# Patient Record
Sex: FEMALE | Race: WHITE | Employment: PART TIME | URBAN - METROPOLITAN AREA
[De-identification: names, ages, dates, MRNs, and addresses within clinical notes are randomized per-mention and may not be internally consistent; named-entity substitution may affect disease eponyms.]

---

## 2020-09-15 ENCOUNTER — NURSE ONLY (OUTPATIENT)
Dept: PRIMARY CARE CLINIC | Age: 65
End: 2020-09-15

## 2020-09-15 PROCEDURE — 99211 OFF/OP EST MAY X REQ PHY/QHP: CPT | Performed by: NURSE PRACTITIONER

## 2020-09-15 NOTE — PROGRESS NOTES
[facility] / [facility] ([facility]) YORDY Cordova [address] [place], [address]    Acknowledgment of Informed Consent for Surgical or Medical Procedure and Sedation  I agree to allow doctor(s) Abhi Del Rosario and his/her associates or assistants, including residents and/or other qualified medical practitioner to perform the following medical treatment or procedure and to administer or direct the administration of sedation as necessary:  Procedure(s): LEFT OPEN REDUCTION INTERNAL FIXATION DISTAL RADIUS; APPLICATION OF SHORT ARM CAST RIGHT ARM  My doctor has explained the following regarding the proposed procedure:   the explanation of the procedure   the benefits of the procedure   the potential problems that might occur during recuperation   the risks and side effects of the procedure which could include but are not limited to severe blood loss, infection, stroke or death   the benefits, risks and side effect of alternative procedures including the consequences of declining this procedure or any alternative procedures   the likelihood of achieving satisfactory results. I acknowledge no guarantee or assurance has been made to me regarding the results. I understand that during the course of this treatment/procedure, unforeseen conditions can occur which require an additional or different procedure. I agree to allow my physician or assistants to perform such extension of the original procedure as they may find necessary. I understand that sedation will often result in temporary impairment of memory and fine motor skills and that sedation can occasionally progress to a state of deep sedation or general anesthesia. I understand the risks of anesthesia for surgery include, but are not limited to, sore throat, hoarseness, injury to face, mouth, or teeth; nausea; headache; injury to blood vessels or nerves; death, brain damage, or paralysis.     I understand that if I have a Limitation of Treatment order in effect during my hospitalization, the order may or may not be in effect during this procedure. I give my doctor permission to give me blood or blood products. I understand that there are risks with receiving blood such as hepatitis, AIDS, fever, or allergic reaction. I acknowledge that the risks, benefits, and alternatives of this treatment have been explained to me and that no express or implied warranty has been given by the hospital, any blood bank, or any person or entity as to the blood or blood components transfused. At the discretion of my doctor, I agree to allow observers, equipment/product representatives and allow photographing, and/or televising of the procedure, provided my name or identity is maintained confidentially. I agree the hospital may dispose of or use for scientific or educational purposes any tissue, fluid, or body parts which may be removed.     ________________________________Date________Time______ am/pm  ([place] [facility])  Patient or Signature of Closest Relative or Legal Guardian    ________________________________Date________Time______am/pm      Page 1 of  1  Witness

## 2020-09-16 LAB — SARS-COV-2, NAA: NOT DETECTED

## 2020-09-16 NOTE — PROGRESS NOTES

## 2020-09-17 ENCOUNTER — ANESTHESIA EVENT (OUTPATIENT)
Dept: OPERATING ROOM | Age: 65
End: 2020-09-17
Payer: COMMERCIAL

## 2020-09-17 NOTE — RESULT ENCOUNTER NOTE
Your test for COVID-19, also known as novel coronavirus, came back negative/ not detected. No virus was detected from the sample collected. Testing is not 100%. Until your symptoms are fully resolved, you may still be contagious. We recommend that you remain isolated for 7 days minimum or 24-48 hours after your symptoms have completely resolved, whichever is longer. If you were exposed to a known positive COVID-19 patient, then you must remain quaratined for 14 days. If you were tested for an upcoming procedue, then you should remain in quaratine until your procedure. Continually monitor symptoms. Contact a medical provider if symptoms are worsening. If you have any additional questions, contact your PCP.     For additional information, please visit the Centers for Disease Control and Prevention [email or web address]

## 2020-09-18 ENCOUNTER — HOSPITAL ENCOUNTER (OUTPATIENT)
Age: 65
Setting detail: OUTPATIENT SURGERY
Discharge: HOME | End: 2020-09-18
Attending: ORTHOPAEDIC SURGERY | Admitting: ORTHOPAEDIC SURGERY
Payer: COMMERCIAL

## 2020-09-18 ENCOUNTER — ANESTHESIA (OUTPATIENT)
Dept: OPERATING ROOM | Age: 65
End: 2020-09-18
Payer: COMMERCIAL

## 2020-09-18 VITALS
OXYGEN SATURATION: 95 % | TEMPERATURE: 98 F | SYSTOLIC BLOOD PRESSURE: 150 MMHG | RESPIRATION RATE: 18 BRPM | HEIGHT: 60 IN | HEART RATE: 78 BPM | BODY MASS INDEX: 28.66 KG/M2 | WEIGHT: 146 LBS | DIASTOLIC BLOOD PRESSURE: 64 MMHG

## 2020-09-18 VITALS — SYSTOLIC BLOOD PRESSURE: 174 MMHG | TEMPERATURE: 97.3 F | OXYGEN SATURATION: 100 % | DIASTOLIC BLOOD PRESSURE: 90 MMHG

## 2020-09-18 DIAGNOSIS — S52.552D OTHER CLOSED EXTRA-ARTICULAR FRACTURE OF DISTAL END OF LEFT RADIUS WITH ROUTINE HEALING, SUBSEQUENT ENCOUNTER: Primary | ICD-10-CM

## 2020-09-18 DIAGNOSIS — S52.531D CLOSED COLLES' FRACTURE OF RIGHT RADIUS WITH ROUTINE HEALING, SUBSEQUENT ENCOUNTER: ICD-10-CM

## 2020-09-18 PROCEDURE — 6370000000 HC RX 637 (ALT 250 FOR IP): Performed by: NURSE ANESTHETIST, CERTIFIED REGISTERED

## 2020-09-18 PROCEDURE — 3600000004 HC SURGERY LEVEL 4 BASE: Performed by: ORTHOPAEDIC SURGERY

## 2020-09-18 PROCEDURE — 7100000001 HC PACU RECOVERY - ADDTL 15 MIN: Performed by: ORTHOPAEDIC SURGERY

## 2020-09-18 PROCEDURE — 7100000000 HC PACU RECOVERY - FIRST 15 MIN: Performed by: ORTHOPAEDIC SURGERY

## 2020-09-18 PROCEDURE — 2580000003 HC RX 258: Performed by: ANESTHESIOLOGY

## 2020-09-18 PROCEDURE — 7100000011 HC PHASE II RECOVERY - ADDTL 15 MIN: Performed by: ORTHOPAEDIC SURGERY

## 2020-09-18 PROCEDURE — 2500000003 HC RX 250 WO HCPCS: Performed by: NURSE ANESTHETIST, CERTIFIED REGISTERED

## 2020-09-18 PROCEDURE — 3700000001 HC ADD 15 MINUTES (ANESTHESIA): Performed by: ORTHOPAEDIC SURGERY

## 2020-09-18 PROCEDURE — 7100000010 HC PHASE II RECOVERY - FIRST 15 MIN: Performed by: ORTHOPAEDIC SURGERY

## 2020-09-18 PROCEDURE — 6360000002 HC RX W HCPCS: Performed by: NURSE ANESTHETIST, CERTIFIED REGISTERED

## 2020-09-18 PROCEDURE — 2709999900 HC NON-CHARGEABLE SUPPLY: Performed by: ORTHOPAEDIC SURGERY

## 2020-09-18 PROCEDURE — 3700000000 HC ANESTHESIA ATTENDED CARE: Performed by: ORTHOPAEDIC SURGERY

## 2020-09-18 PROCEDURE — C1713 ANCHOR/SCREW BN/BN,TIS/BN: HCPCS | Performed by: ORTHOPAEDIC SURGERY

## 2020-09-18 PROCEDURE — 2580000003 HC RX 258: Performed by: NURSE ANESTHETIST, CERTIFIED REGISTERED

## 2020-09-18 PROCEDURE — 2580000003 HC RX 258: Performed by: ORTHOPAEDIC SURGERY

## 2020-09-18 PROCEDURE — 6360000002 HC RX W HCPCS: Performed by: ORTHOPAEDIC SURGERY

## 2020-09-18 PROCEDURE — 2720000010 HC SURG SUPPLY STERILE: Performed by: ORTHOPAEDIC SURGERY

## 2020-09-18 PROCEDURE — 2500000003 HC RX 250 WO HCPCS: Performed by: ORTHOPAEDIC SURGERY

## 2020-09-18 PROCEDURE — 3600000014 HC SURGERY LEVEL 4 ADDTL 15MIN: Performed by: ORTHOPAEDIC SURGERY

## 2020-09-18 PROCEDURE — 6360000002 HC RX W HCPCS: Performed by: ANESTHESIOLOGY

## 2020-09-18 DEVICE — SCREW BNE L16MM DIA2.4MM DST RAD VOLAR S STL ST VAR ANG LOK: Type: IMPLANTABLE DEVICE | Site: WRIST | Status: FUNCTIONAL

## 2020-09-18 DEVICE — PLATE BNE W22XL54MM STD 9 H R DST RAD VOLAR S STL VAR ANG: Type: IMPLANTABLE DEVICE | Site: WRIST | Status: FUNCTIONAL

## 2020-09-18 DEVICE — SCREW BNE L18MM DIA2.4MM DST RAD VOLAR S STL ST VAR ANG LOK: Type: IMPLANTABLE DEVICE | Site: WRIST | Status: FUNCTIONAL

## 2020-09-18 DEVICE — SCREW BNE L12MM DIA2.7MM CORT S STL ST T8 STARDRV RECESS: Type: IMPLANTABLE DEVICE | Site: WRIST | Status: FUNCTIONAL

## 2020-09-18 DEVICE — SCREW BNE L14MM DIA2.4MM DST RAD VOLAR S STL ST VAR ANG LOK: Type: IMPLANTABLE DEVICE | Site: WRIST | Status: FUNCTIONAL

## 2020-09-18 RX ORDER — ROPIVACAINE HYDROCHLORIDE 5 MG/ML
30 INJECTION, SOLUTION EPIDURAL; INFILTRATION; PERINEURAL ONCE
Status: DISCONTINUED | OUTPATIENT
Start: 2020-09-18 | End: 2020-09-18 | Stop reason: HOSPADM

## 2020-09-18 RX ORDER — HYDROMORPHONE HCL 110MG/55ML
PATIENT CONTROLLED ANALGESIA SYRINGE INTRAVENOUS PRN
Status: DISCONTINUED | OUTPATIENT
Start: 2020-09-18 | End: 2020-09-18 | Stop reason: SDUPTHER

## 2020-09-18 RX ORDER — ATORVASTATIN CALCIUM 10 MG/1
10 TABLET, FILM COATED ORAL DAILY
COMMUNITY

## 2020-09-18 RX ORDER — LABETALOL 20 MG/4 ML (5 MG/ML) INTRAVENOUS SYRINGE
5 EVERY 10 MIN PRN
Status: DISCONTINUED | OUTPATIENT
Start: 2020-09-18 | End: 2020-09-18 | Stop reason: HOSPADM

## 2020-09-18 RX ORDER — ACETAMINOPHEN AND CODEINE PHOSPHATE 300; 30 MG/1; MG/1
1 TABLET ORAL EVERY 4 HOURS PRN
Status: ON HOLD | COMMUNITY
End: 2020-09-18 | Stop reason: SDUPTHER

## 2020-09-18 RX ORDER — LETROZOLE 2.5 MG/1
2.5 TABLET, FILM COATED ORAL DAILY
COMMUNITY
End: 2021-08-31

## 2020-09-18 RX ORDER — MEPERIDINE HYDROCHLORIDE 25 MG/ML
12.5 INJECTION INTRAMUSCULAR; INTRAVENOUS; SUBCUTANEOUS EVERY 5 MIN PRN
Status: DISCONTINUED | OUTPATIENT
Start: 2020-09-18 | End: 2020-09-18 | Stop reason: HOSPADM

## 2020-09-18 RX ORDER — OXYCODONE HYDROCHLORIDE AND ACETAMINOPHEN 5; 325 MG/1; MG/1
2 TABLET ORAL PRN
Status: DISCONTINUED | OUTPATIENT
Start: 2020-09-18 | End: 2020-09-18 | Stop reason: HOSPADM

## 2020-09-18 RX ORDER — SODIUM CHLORIDE, SODIUM LACTATE, POTASSIUM CHLORIDE, CALCIUM CHLORIDE 600; 310; 30; 20 MG/100ML; MG/100ML; MG/100ML; MG/100ML
INJECTION, SOLUTION INTRAVENOUS CONTINUOUS PRN
Status: DISCONTINUED | OUTPATIENT
Start: 2020-09-18 | End: 2020-09-18 | Stop reason: SDUPTHER

## 2020-09-18 RX ORDER — OXYCODONE HYDROCHLORIDE AND ACETAMINOPHEN 5; 325 MG/1; MG/1
1 TABLET ORAL PRN
Status: DISCONTINUED | OUTPATIENT
Start: 2020-09-18 | End: 2020-09-18 | Stop reason: HOSPADM

## 2020-09-18 RX ORDER — DIPHENHYDRAMINE HYDROCHLORIDE 50 MG/ML
INJECTION INTRAMUSCULAR; INTRAVENOUS PRN
Status: DISCONTINUED | OUTPATIENT
Start: 2020-09-18 | End: 2020-09-18 | Stop reason: SDUPTHER

## 2020-09-18 RX ORDER — DIPHENHYDRAMINE HYDROCHLORIDE 50 MG/ML
12.5 INJECTION INTRAMUSCULAR; INTRAVENOUS
Status: DISCONTINUED | OUTPATIENT
Start: 2020-09-18 | End: 2020-09-18 | Stop reason: HOSPADM

## 2020-09-18 RX ORDER — BUPIVACAINE HYDROCHLORIDE 5 MG/ML
INJECTION, SOLUTION EPIDURAL; INTRACAUDAL PRN
Status: DISCONTINUED | OUTPATIENT
Start: 2020-09-18 | End: 2020-09-18 | Stop reason: ALTCHOICE

## 2020-09-18 RX ORDER — ONDANSETRON 4 MG/1
4 TABLET, ORALLY DISINTEGRATING ORAL EVERY 8 HOURS PRN
Qty: 21 TABLET | Refills: 0 | Status: SHIPPED | OUTPATIENT
Start: 2020-09-18

## 2020-09-18 RX ORDER — SODIUM CHLORIDE 9 MG/ML
INJECTION, SOLUTION INTRAVENOUS CONTINUOUS
Status: DISCONTINUED | OUTPATIENT
Start: 2020-09-18 | End: 2020-09-18 | Stop reason: HOSPADM

## 2020-09-18 RX ORDER — ASPIRIN 81 MG/1
81 TABLET ORAL DAILY
COMMUNITY

## 2020-09-18 RX ORDER — FENTANYL CITRATE 50 UG/ML
INJECTION, SOLUTION INTRAMUSCULAR; INTRAVENOUS PRN
Status: DISCONTINUED | OUTPATIENT
Start: 2020-09-18 | End: 2020-09-18 | Stop reason: SDUPTHER

## 2020-09-18 RX ORDER — SCOLOPAMINE TRANSDERMAL SYSTEM 1 MG/1
PATCH, EXTENDED RELEASE TRANSDERMAL PRN
Status: DISCONTINUED | OUTPATIENT
Start: 2020-09-18 | End: 2020-09-18 | Stop reason: SDUPTHER

## 2020-09-18 RX ORDER — HYDRALAZINE HYDROCHLORIDE 20 MG/ML
5 INJECTION INTRAMUSCULAR; INTRAVENOUS EVERY 10 MIN PRN
Status: DISCONTINUED | OUTPATIENT
Start: 2020-09-18 | End: 2020-09-18 | Stop reason: HOSPADM

## 2020-09-18 RX ORDER — PROCHLORPERAZINE EDISYLATE 5 MG/ML
5 INJECTION INTRAMUSCULAR; INTRAVENOUS
Status: COMPLETED | OUTPATIENT
Start: 2020-09-18 | End: 2020-09-18

## 2020-09-18 RX ORDER — ACETAMINOPHEN AND CODEINE PHOSPHATE 300; 30 MG/1; MG/1
1 TABLET ORAL EVERY 6 HOURS PRN
Qty: 28 TABLET | Refills: 0 | Status: SHIPPED | OUTPATIENT
Start: 2020-09-18 | End: 2020-09-25

## 2020-09-18 RX ORDER — MIDAZOLAM HYDROCHLORIDE 1 MG/ML
INJECTION INTRAMUSCULAR; INTRAVENOUS PRN
Status: DISCONTINUED | OUTPATIENT
Start: 2020-09-18 | End: 2020-09-18 | Stop reason: SDUPTHER

## 2020-09-18 RX ORDER — SODIUM CHLORIDE 0.9 % (FLUSH) 0.9 %
10 SYRINGE (ML) INJECTION PRN
Status: DISCONTINUED | OUTPATIENT
Start: 2020-09-18 | End: 2020-09-18 | Stop reason: HOSPADM

## 2020-09-18 RX ORDER — LIDOCAINE HYDROCHLORIDE 20 MG/ML
INJECTION, SOLUTION INTRAVENOUS PRN
Status: DISCONTINUED | OUTPATIENT
Start: 2020-09-18 | End: 2020-09-18 | Stop reason: SDUPTHER

## 2020-09-18 RX ORDER — ONDANSETRON 2 MG/ML
INJECTION INTRAMUSCULAR; INTRAVENOUS PRN
Status: DISCONTINUED | OUTPATIENT
Start: 2020-09-18 | End: 2020-09-18 | Stop reason: SDUPTHER

## 2020-09-18 RX ORDER — SODIUM CHLORIDE, SODIUM LACTATE, POTASSIUM CHLORIDE, CALCIUM CHLORIDE 600; 310; 30; 20 MG/100ML; MG/100ML; MG/100ML; MG/100ML
INJECTION, SOLUTION INTRAVENOUS CONTINUOUS
Status: DISCONTINUED | OUTPATIENT
Start: 2020-09-18 | End: 2020-09-18 | Stop reason: HOSPADM

## 2020-09-18 RX ORDER — FENTANYL CITRATE 50 UG/ML
50 INJECTION, SOLUTION INTRAMUSCULAR; INTRAVENOUS EVERY 5 MIN PRN
Status: DISCONTINUED | OUTPATIENT
Start: 2020-09-18 | End: 2020-09-18 | Stop reason: HOSPADM

## 2020-09-18 RX ORDER — MAGNESIUM HYDROXIDE 1200 MG/15ML
LIQUID ORAL CONTINUOUS PRN
Status: COMPLETED | OUTPATIENT
Start: 2020-09-18 | End: 2020-09-18

## 2020-09-18 RX ORDER — FENTANYL CITRATE 50 UG/ML
25 INJECTION, SOLUTION INTRAMUSCULAR; INTRAVENOUS EVERY 5 MIN PRN
Status: DISCONTINUED | OUTPATIENT
Start: 2020-09-18 | End: 2020-09-18 | Stop reason: HOSPADM

## 2020-09-18 RX ORDER — SODIUM CHLORIDE 0.9 % (FLUSH) 0.9 %
10 SYRINGE (ML) INJECTION EVERY 12 HOURS SCHEDULED
Status: DISCONTINUED | OUTPATIENT
Start: 2020-09-18 | End: 2020-09-18 | Stop reason: HOSPADM

## 2020-09-18 RX ORDER — AMLODIPINE BESYLATE 5 MG/1
5 TABLET ORAL DAILY
COMMUNITY

## 2020-09-18 RX ORDER — ONDANSETRON 2 MG/ML
4 INJECTION INTRAMUSCULAR; INTRAVENOUS
Status: COMPLETED | OUTPATIENT
Start: 2020-09-18 | End: 2020-09-18

## 2020-09-18 RX ORDER — PROPOFOL 10 MG/ML
INJECTION, EMULSION INTRAVENOUS PRN
Status: DISCONTINUED | OUTPATIENT
Start: 2020-09-18 | End: 2020-09-18 | Stop reason: SDUPTHER

## 2020-09-18 RX ADMIN — HYDROMORPHONE HYDROCHLORIDE 0.5 MG: 2 INJECTION, SOLUTION INTRAMUSCULAR; INTRAVENOUS; SUBCUTANEOUS at 08:03

## 2020-09-18 RX ADMIN — HYDROMORPHONE HYDROCHLORIDE 0.5 MG: 1 INJECTION, SOLUTION INTRAMUSCULAR; INTRAVENOUS; SUBCUTANEOUS at 10:37

## 2020-09-18 RX ADMIN — CEFAZOLIN 2 G: 10 INJECTION, POWDER, FOR SOLUTION INTRAVENOUS at 07:37

## 2020-09-18 RX ADMIN — DIPHENHYDRAMINE HYDROCHLORIDE 25 MG: 50 INJECTION, SOLUTION INTRAMUSCULAR; INTRAVENOUS at 07:46

## 2020-09-18 RX ADMIN — HYDROMORPHONE HYDROCHLORIDE 0.25 MG: 1 INJECTION, SOLUTION INTRAMUSCULAR; INTRAVENOUS; SUBCUTANEOUS at 11:20

## 2020-09-18 RX ADMIN — HYDROMORPHONE HYDROCHLORIDE 0.5 MG: 2 INJECTION, SOLUTION INTRAMUSCULAR; INTRAVENOUS; SUBCUTANEOUS at 08:10

## 2020-09-18 RX ADMIN — SODIUM CHLORIDE, SODIUM LACTATE, POTASSIUM CHLORIDE, AND CALCIUM CHLORIDE: 600; 310; 30; 20 INJECTION, SOLUTION INTRAVENOUS at 07:20

## 2020-09-18 RX ADMIN — FENTANYL CITRATE 50 MCG: 50 INJECTION INTRAMUSCULAR; INTRAVENOUS at 10:06

## 2020-09-18 RX ADMIN — SCOPOLAMINE 1 PATCH: 1 PATCH, EXTENDED RELEASE TRANSDERMAL at 07:34

## 2020-09-18 RX ADMIN — HYDROMORPHONE HYDROCHLORIDE 0.25 MG: 1 INJECTION, SOLUTION INTRAMUSCULAR; INTRAVENOUS; SUBCUTANEOUS at 10:52

## 2020-09-18 RX ADMIN — ONDANSETRON 4 MG: 2 INJECTION INTRAMUSCULAR; INTRAVENOUS at 09:32

## 2020-09-18 RX ADMIN — FENTANYL CITRATE 50 MCG: 50 INJECTION INTRAMUSCULAR; INTRAVENOUS at 07:58

## 2020-09-18 RX ADMIN — PROPOFOL 80 MG: 10 INJECTION, EMULSION INTRAVENOUS at 07:42

## 2020-09-18 RX ADMIN — ONDANSETRON 4 MG: 2 INJECTION INTRAMUSCULAR; INTRAVENOUS at 08:55

## 2020-09-18 RX ADMIN — FENTANYL CITRATE 50 MCG: 50 INJECTION INTRAMUSCULAR; INTRAVENOUS at 07:55

## 2020-09-18 RX ADMIN — PROCHLORPERAZINE EDISYLATE 5 MG: 5 INJECTION INTRAMUSCULAR; INTRAVENOUS at 10:19

## 2020-09-18 RX ADMIN — FENTANYL CITRATE 50 MCG: 50 INJECTION INTRAMUSCULAR; INTRAVENOUS at 09:45

## 2020-09-18 RX ADMIN — MIDAZOLAM HYDROCHLORIDE 2 MG: 2 INJECTION, SOLUTION INTRAMUSCULAR; INTRAVENOUS at 07:34

## 2020-09-18 RX ADMIN — SODIUM CHLORIDE, SODIUM LACTATE, POTASSIUM CHLORIDE, AND CALCIUM CHLORIDE: 600; 310; 30; 20 INJECTION, SOLUTION INTRAVENOUS at 08:44

## 2020-09-18 RX ADMIN — LIDOCAINE HYDROCHLORIDE 100 MG: 20 INJECTION, SOLUTION INTRAVENOUS at 07:42

## 2020-09-18 RX ADMIN — FAMOTIDINE 20 MG: 10 INJECTION, SOLUTION INTRAVENOUS at 07:46

## 2020-09-18 RX ADMIN — SODIUM CHLORIDE, POTASSIUM CHLORIDE, SODIUM LACTATE AND CALCIUM CHLORIDE: 600; 310; 30; 20 INJECTION, SOLUTION INTRAVENOUS at 07:21

## 2020-09-18 ASSESSMENT — PULMONARY FUNCTION TESTS
PIF_VALUE: 17
PIF_VALUE: 17
PIF_VALUE: 4
PIF_VALUE: 17
PIF_VALUE: 13
PIF_VALUE: 17
PIF_VALUE: 3
PIF_VALUE: 17
PIF_VALUE: 14
PIF_VALUE: 17
PIF_VALUE: 17
PIF_VALUE: 18
PIF_VALUE: 17
PIF_VALUE: 17
PIF_VALUE: 18
PIF_VALUE: 0
PIF_VALUE: 17
PIF_VALUE: 17
PIF_VALUE: 14
PIF_VALUE: 17
PIF_VALUE: 24
PIF_VALUE: 17
PIF_VALUE: 14
PIF_VALUE: 17
PIF_VALUE: 15
PIF_VALUE: 17
PIF_VALUE: 18
PIF_VALUE: 17
PIF_VALUE: 13
PIF_VALUE: 3
PIF_VALUE: 13
PIF_VALUE: 17
PIF_VALUE: 17
PIF_VALUE: 9
PIF_VALUE: 11
PIF_VALUE: 17
PIF_VALUE: 18
PIF_VALUE: 17
PIF_VALUE: 17
PIF_VALUE: 0
PIF_VALUE: 17
PIF_VALUE: 18
PIF_VALUE: 17
PIF_VALUE: 0
PIF_VALUE: 15
PIF_VALUE: 17
PIF_VALUE: 15
PIF_VALUE: 17
PIF_VALUE: 17
PIF_VALUE: 18
PIF_VALUE: 14
PIF_VALUE: 14
PIF_VALUE: 15
PIF_VALUE: 17
PIF_VALUE: 17
PIF_VALUE: 13
PIF_VALUE: 13
PIF_VALUE: 17
PIF_VALUE: 17

## 2020-09-18 ASSESSMENT — PAIN SCALES - GENERAL
PAINLEVEL_OUTOF10: 9
PAINLEVEL_OUTOF10: 4
PAINLEVEL_OUTOF10: 9
PAINLEVEL_OUTOF10: 9
PAINLEVEL_OUTOF10: 5
PAINLEVEL_OUTOF10: 4

## 2020-09-18 ASSESSMENT — PAIN DESCRIPTION - FREQUENCY
FREQUENCY: CONTINUOUS
FREQUENCY: CONTINUOUS

## 2020-09-18 ASSESSMENT — PAIN DESCRIPTION - ONSET
ONSET: ON-GOING

## 2020-09-18 ASSESSMENT — PAIN DESCRIPTION - DESCRIPTORS
DESCRIPTORS: DISCOMFORT

## 2020-09-18 ASSESSMENT — PAIN DESCRIPTION - ORIENTATION
ORIENTATION: LEFT
ORIENTATION: LEFT

## 2020-09-18 ASSESSMENT — PAIN - FUNCTIONAL ASSESSMENT: PAIN_FUNCTIONAL_ASSESSMENT: 0-10

## 2020-09-18 ASSESSMENT — PAIN DESCRIPTION - PAIN TYPE
TYPE: SURGICAL PAIN
TYPE: SURGICAL PAIN

## 2020-09-18 ASSESSMENT — PAIN DESCRIPTION - LOCATION
LOCATION: WRIST

## 2020-09-18 ASSESSMENT — PAIN DESCRIPTION - PROGRESSION
CLINICAL_PROGRESSION: GRADUALLY IMPROVING
CLINICAL_PROGRESSION: NOT CHANGED
CLINICAL_PROGRESSION: GRADUALLY IMPROVING

## 2020-09-18 NOTE — PROGRESS NOTES
All discharge instructions given to patient's  in lobby with full understanding verbalized and all questions answered at [phone]. Copy provided along with prescriptions x 2.

## 2020-09-18 NOTE — PROGRESS NOTES
Patient admitted to PACU #17 from OR per stretcher at 0603 s/p LEFT OPEN REDUCTION INTERNAL FIXATION DISTAL RADIUS; APPLICATION OF SHORT ARM CAST RIGHT ARM (Left). Report received at bedside in PACU per CRNA. Patient was reported to be hemodynamically stable in OR with no complications reported. Patient connected to PACU monitoring equipment. IVF's left foot infusing with site unremarkable. Patient arrived to PACU responsive but not fully wakeful from anesthesia but with respirations easy, even and regular with no pain noted or verbalized. Right hand/lower arm with cast in place. Left hand/lower arm with splint in place. LUE elevated on pillow upon arrival to PACU with ice pack applied. No further changes. Will continue to monitor.

## 2020-09-18 NOTE — PROGRESS NOTES
Per Dr Megan Walker the IV will need to be in foot. Patient agrees and wants to avoid right foot. IV per Dr Megan Walker.

## 2020-09-18 NOTE — PROGRESS NOTES
Patient's  updated per Jean Carlos Moe in family waiting at [phone] and informed that we are waiting for a bed in same day for discharge.

## 2020-09-18 NOTE — PROGRESS NOTES
Ambulatory Surgery/Procedure Discharge Note    Vitals:    09/18/20 1215   BP: (!) 150/64   Pulse: 78   Resp: 18   Temp: 98 °F (36.7 °C)   SpO2: 95%       In: 1337 [P.O.:60; I.V.:1277]  Out: -     Restroom use offered before discharge. Yes but patient denied    Pain assessment:  present - adequately treated  Pain Level: 4        Patient discharged to home/self care. Patient discharged via wheel chair by transporter to waiting family/S. O. patient meets brenden discharge criteria from PACU. Discharged to home per wheelchair with . No further complaints or concerns verbalized.        9/18/2020 12:35 PM

## 2020-09-18 NOTE — ANESTHESIA PRE PROCEDURE
Packs/day: 1.00     Years: 30.00     Pack years: 30.00    Smokeless tobacco: Never Used   Substance Use Topics    Alcohol use: No                                Ready to quit: Not Answered  Counseling given: Not Answered      Vital Signs (Current):   Vitals:    09/18/20 0625   BP: 131/77   Pulse: 70   Resp: 16   Temp: 98.1 °F (36.7 °C)   TempSrc: Oral   SpO2: 94%   Weight: 146 lb (66.2 kg)   Height: 5' (1.524 m)                                              BP Readings from Last 3 Encounters:   09/18/20 131/77   03/04/11 116/60       NPO Status:                                                                                 BMI:   Wt Readings from Last 3 Encounters:   09/18/20 146 lb (66.2 kg)   03/04/11 145 lb (65.8 kg)     Body mass index is 28.51 kg/m². CBC: No results found for: WBC, RBC, HGB, HCT, MCV, RDW, PLT    CMP:   Lab Results   Component Value Date    CO2 24 10/20/2014    CREATININE 1.1 10/20/2014    GFRAA >60 10/20/2014    LABGLOM 54 10/20/2014       POC Tests: No results for input(s): POCGLU, POCNA, POCK, POCCL, POCBUN, POCHEMO, POCHCT in the last 72 hours.     Coags: No results found for: PROTIME, INR, APTT    HCG (If Applicable): No results found for: PREGTESTUR, PREGSERUM, HCG, HCGQUANT     ABGs: No results found for: PHART, PO2ART, NKD9IGW, TGY4BOM, BEART, Z8ISRRGW     Type & Screen (If Applicable):  No results found for: LABABO, LABRH    Drug/Infectious Status (If Applicable):  No results found for: HIV, HEPCAB    COVID-19 Screening (If Applicable):   Lab Results   Component Value Date    COVID19 NOT DETECTED 09/15/2020         Anesthesia Evaluation   no history of anesthetic complications:   Airway: Mallampati: II  TM distance: >3 FB   Neck ROM: full  Mouth opening: > = 3 FB Dental:          Pulmonary:       (-) asthma and sleep apnea                           Cardiovascular:  Exercise tolerance: poor (<4 METS),   (+) past MI:, CAD:,                   Neuro/Psych:      (-) seizures GI/Hepatic/Renal:        (-) GERD       Endo/Other:    (+) malignancy/cancer (breast). (-) diabetes mellitus               Abdominal:           Vascular:                                      Anesthesia Plan      general     ASA 3     (-npo MN  -denies chest pain or palp. Was having severe chest pain in 2014 when she had her MI/stent. Denies sob.)      MIPS: Postoperative opioids intended. Anesthetic plan and risks discussed with patient. Plan discussed with CRNA.                 Miles Platt MD   9/18/2020

## 2020-09-18 NOTE — BRIEF OP NOTE
Brief Postoperative Note      Patient: Christiano Marroquin  YOB: 1955  MRN: [id number]    Date of Procedure: 9/18/2020    Pre-Op Diagnosis: LEFT DISTAL RADIUS FRACTURE, right distal radius fracture    Post-Op Diagnosis: Same       Procedure(s):  LEFT OPEN REDUCTION INTERNAL FIXATION DISTAL RADIUS; APPLICATION OF SHORT ARM CAST RIGHT ARM, interpretation of fluoroscopic images.     Surgeon(s):  Kristy Almendarez MD    Assistant:  Surgical Assistant: Tashia Burnham    Anesthesia: General    Estimated Blood Loss (mL): less than 50     Complications: None    Specimens:   * No specimens in log *    Implants:  * No implants in log *      Drains: * No LDAs found *    Findings: Displaced extra-articular left distal radius fracture, right nondisplaced distal radius fracture with maintained alignment    Electronically signed by Kristy Almendarez MD on 9/18/2020 at 9:13 AM

## 2020-09-18 NOTE — OP NOTE
Eliza Orellana MD  [address] [place] [address] Office: [address] [facility], [id number], [address] [facility]  [phone] [place] [phone] [facility]  9/15/2020    Pre-operative Diagnosis:Left distal radius fracture, Right Distal radius fracture     Post-operative Diagnosis: same    Procedure:   1. Open reduction internal fixation of Left Distal radius fracture with 0 intra-articular fragments,   2. Intraoperative fluoroscopy interpretation, with multiple views of Left wrist  3. Application of short arm cast to RIGHT arm    Surgeon: Zoie Danielle MD    Assistant:  Aimee Holm, for the purposes of retraction, assistance for reduction, and manipulation of the limb, and assistance with wound closure as needed. Anesthesia:  General, local    Atibiotic: CEFAZOLIN, DOSING PER NURSING CHART    Complications:  None    ESTIMATED BLOOD LOSS:  20mL    Implant: * No implants in log *    Synthes 2.4 VA 2 column distal radius plate    INDICATIONS FOR PROCEDURE: The patient has an unstable Left distal radius fracture and right distal radius fracture for cast change. The patient understands the relevant risks, benefits, limitations, alternatives, and healing process of the procedure. PROCEDURE: The patient was given IV antibiotics, a regional block, and brought to the operating room and anesthetized with general anesthesia. The identified and marked extremity was then prepped and draped in a standard fashion. A well-padded tourniquet was applied to the upper arm. The arm was exsanguinated and the tourniquet elevated to 250 mmHg. Preoperative timeout was performed confirming the correct patient, procedure, laterality and other pertinent details and all present were in agreement. A volar radial incision was the made over the wrist.  Dissection carried through skin, subcutaneous tissue, and the FCR sheath.   The pronator quadratus was elevated off the distal radius and the fracture fragments identified. Via traction, distraction, and manual reduction, the volar and articular alignment was reduced. A volar plate was then applied and the proximal and distal screws inserted. Intraoperative xray demonstrated well maintained fracture and hardware position. The wrist was taken through a range of motion. Stability of the wrist was tested and the DRUJ, radial-carpal, and intra-carpal joints were found to be satisfactory. It should be noted that 3 views of the operative Left wrist were performed with mini C-Arm fluoroscopy for the distal radius fracture. AP, lateral, and oblique views demonstrated satisfactory alignment of the fracture and hardware. The wounds were irrigated with sterile saline and hemostasis achieved with electrocautery. Closure was performed with subcutaneous 2-0 vicryl, and 4-0 Nylon at skin. A post-op dressing and volar splint was applied and the patient transported to the recovery area in stable condition with good perfusion to all fingertips. The right distal radius splint was then removed, maintained alignment confirmed on AP and lateral fluoroscopic views, and a well padded fiberglass cast was applied with a gentle mold to promote stability    Post-Op Course: Follow-up in 7-10 days, wound inspection and likely suture removal.  Transition to wrist brace at that time. No strengthening or lifting for 6-8 weeks.         Nicho Ramirez MD  [facility]  Office: [phone]  Cell: [phone]    09/18/20  9:14 AM

## 2020-09-18 NOTE — H&P
Edmundo Reeves    [id number]    [facility] Same Day Surgery Update H & P  Department of General Surgery   Surgical Service   Pre-operative History and Physical  Last H & P within the last 30 days. DIAGNOSIS:   LEFT DISTAL RADIUS FRACTURE    Procedure(s):  LEFT OPEN REDUCTION INTERNAL FIXATION DISTAL RADIUS     HISTORY OF PRESENT ILLNESS:   Patient with displaced left distal radius fracture presents today for ORIF. Covid 19:  Patient denies fever, chills, cough or known exposure to Covid-19.   Patient reports they have not been quarantined at home since Covid-19 test.      Past Medical History:        Diagnosis Date    CAD (coronary artery disease)     Dental bridge present     Dental crowns present     Osteoporosis      Past Surgical History:        Procedure Laterality Date    AXILLARY SURGERY Right     BREAST SURGERY Right     CORONARY ANGIOPLASTY WITH STENT PLACEMENT      DILATION AND CURETTAGE OF UTERUS      TONSILLECTOMY AND ADENOIDECTOMY       Past Social History:  Social History     Socioeconomic History    Marital status:      Spouse name: None    Number of children: None    Years of education: None    Highest education level: None   Occupational History    None   Social Needs    Financial resource strain: None    Food insecurity     Worry: None     Inability: None    Transportation needs     Medical: None     Non-medical: None   Tobacco Use    Smoking status: Current Every Day Smoker     Packs/day: 1.00     Years: 30.00     Pack years: 30.00    Smokeless tobacco: Never Used   Substance and Sexual Activity    Alcohol use: No    Drug use: No    Sexual activity: None   Lifestyle    Physical activity     Days per week: None     Minutes per session: None    Stress: None   Relationships    Social connections     Talks on phone: None     Gets together: None     Attends Mandaeism service: None     Active member of club or organization: None     Attends meetings of clubs or organizations: None     Relationship status: None    Intimate partner violence     Fear of current or ex partner: None     Emotionally abused: None     Physically abused: None     Forced sexual activity: None   Other Topics Concern    None   Social History Narrative    None         Medications Prior to Admission:      Prior to Admission medications    Medication Sig Start Date End Date Taking? Authorizing Provider   letrozole (FEMARA) 2.5 MG tablet Take 2.5 mg by mouth daily   Yes Historical Provider, MD   atorvastatin (LIPITOR) 10 MG tablet Take 10 mg by mouth daily   Yes Historical Provider, MD   amLODIPine (NORVASC) 5 MG tablet Take 5 mg by mouth daily   Yes Historical Provider, MD   aspirin 81 MG EC tablet Take 81 mg by mouth daily   Yes Historical Provider, MD   Denosumab (PROLIA SC) Inject 1 Syringe into the skin every 6 months   Yes Historical Provider, MD   acetaminophen-codeine (TYLENOL/CODEINE #3) 300-30 MG per tablet Take 1 tablet by mouth every 6 hours as needed for Pain for up to 7 days. 9/18/20 9/25/20 Yes Radha Varela MD   ondansetron (ZOFRAN-ODT) 4 MG disintegrating tablet Take 1 tablet by mouth every 8 hours as needed for Nausea or Vomiting 9/18/20  Yes Radha Varela MD         Allergies:  Codeine and Biaxin [clarithromycin]    PHYSICAL EXAM:      /77   Pulse 70   Temp 98.1 °F (36.7 °C) (Oral)   Resp 16   Ht 5' (1.524 m)   Wt 146 lb (66.2 kg)   SpO2 94%   BMI 28.51 kg/m²      Airway:  Airway patent with no audible stridor    Heart:  regular rate and rhythm, No murmur noted    Lungs:  No increased work of breathing, good air exchange, clear to auscultation bilaterally, no crackles or wheezing    Abdomen:  soft, non-distended, non-tender, no rebound tenderness or guarding, normal active bowel sounds and no masses palpated    ASSESSMENT AND PLAN     Patient is a 71 y.o. female with above specified procedure planned.     1.  Patient seen and focused exam done today-

## 2020-09-18 NOTE — ANESTHESIA POSTPROCEDURE EVALUATION
Department of Anesthesiology  Postprocedure Note    Patient: Vira Pyle  MRN: [id number]  YOB: 1955  Date of evaluation: 9/18/2020  Time:  12:34 PM     Procedure Summary     Date:  09/18/20 Room / Location:  [address] [place] [address] [address]    Anesthesia Start:  5336 Anesthesia Stop:  4057    Procedure:  LEFT OPEN REDUCTION INTERNAL FIXATION DISTAL RADIUS; APPLICATION OF SHORT ARM CAST RIGHT ARM (Left ) Diagnosis:  (LEFT DISTAL RADIUS FRACTURE)    Surgeon:  Kristin Goldman MD Responsible Provider:  Juliann Palacio MD    Anesthesia Type:  general ASA Status:  3          Anesthesia Type: general    Huyen Phase I: Huyen Score: 10    Huyen Phase II: Huyen Score: 10    Last vitals: Reviewed and per EMR flowsheets.        Anesthesia Post Evaluation    Patient location during evaluation: PACU  Patient participation: complete - patient participated  Level of consciousness: awake  Pain score: 2  Airway patency: patent  Nausea & Vomiting: no nausea and no vomiting  Complications: no  Cardiovascular status: hemodynamically stable  Respiratory status: acceptable  Hydration status: euvolemic

## 2020-10-06 ENCOUNTER — OFFICE VISIT (OUTPATIENT)
Dept: PRIMARY CARE CLINIC | Age: 65
End: 2020-10-06
Payer: COMMERCIAL

## 2020-10-06 PROCEDURE — 99211 OFF/OP EST MAY X REQ PHY/QHP: CPT | Performed by: NURSE PRACTITIONER

## 2020-10-06 NOTE — PROGRESS NOTES
[facility] / [facility] ([facility]) [address] [facility], [address]    Acknowledgment of Informed Consent for Surgical or Medical Procedure and Sedation  I agree to allow doctor(s) Sue French and his/her associates or assistants, including residents and/or other qualified medical practitioner to perform the following medical treatment or procedure and to administer or direct the administration of sedation as necessary:  Procedure(s): RIGHT WRIST CLOSED REDUCTION AND CASTING VERSUS DISTAL RADIUS OPEN REDUCTION INTERNAL FIXATION  My doctor has explained the following regarding the proposed procedure:   the explanation of the procedure   the benefits of the procedure   the potential problems that might occur during recuperation   the risks and side effects of the procedure which could include but are not limited to severe blood loss, infection, stroke or death   the benefits, risks and side effect of alternative procedures including the consequences of declining this procedure or any alternative procedures   the likelihood of achieving satisfactory results. I acknowledge no guarantee or assurance has been made to me regarding the results. I understand that during the course of this treatment/procedure, unforeseen conditions can occur which require an additional or different procedure. I agree to allow my physician or assistants to perform such extension of the original procedure as they may find necessary. I understand that sedation will often result in temporary impairment of memory and fine motor skills and that sedation can occasionally progress to a state of deep sedation or general anesthesia. I understand the risks of anesthesia for surgery include, but are not limited to, sore throat, hoarseness, injury to face, mouth, or teeth; nausea; headache; injury to blood vessels or nerves; death, brain damage, or paralysis.     I understand that if I have a Limitation of Treatment order in effect during my hospitalization, the order may or may not be in effect during this procedure. I give my doctor permission to give me blood or blood products. I understand that there are risks with receiving blood such as hepatitis, AIDS, fever, or allergic reaction. I acknowledge that the risks, benefits, and alternatives of this treatment have been explained to me and that no express or implied warranty has been given by the hospital, any blood bank, or any person or entity as to the blood or blood components transfused. At the discretion of my doctor, I agree to allow observers, equipment/product representatives and allow photographing, and/or televising of the procedure, provided my name or identity is maintained confidentially. I agree the hospital may dispose of or use for scientific or educational purposes any tissue, fluid, or body parts which may be removed.     ________________________________Date________Time______ am/pm  ([employer])  Patient or Signature of Closest Relative or Legal Guardian    ________________________________Date________Time______am/pm      Page 1 of  1  Witness

## 2020-10-07 ENCOUNTER — ANESTHESIA EVENT (OUTPATIENT)
Dept: OPERATING ROOM | Age: 65
End: 2020-10-07
Payer: COMMERCIAL

## 2020-10-07 NOTE — PROGRESS NOTES
[facility] PRE-SURGICAL TESTING INSTRUCTIONS                              PRIOR TO PROCEDURE DATE:  1. Please follow any guidelines/instructions prior to your procedure as advised by your surgeon. 2. Arrange for someone to drive you home and be with you for the first 24 hours after discharge for your safety after your procedure for which you received sedation. Ensure it is someone we can share information with regarding your discharge. 3. You must contact your surgeon for instructions IF:   You are taking any blood thinners, aspirin, anti-inflammatory or vitamin E.   There is a change in your physical condition such as a cold, fever, rash, cuts, sores or any other infection, especially near your surgical site. 4. Do not drink alcohol the day before or day of your procedure. 5. A Pre-op History and Physical for surgery MUST be completed by your Physician or Urgent Care within 30 days of your procedure date. Please bring a copy with you on the day of your procedure and along with any other testing performed. THE DAY OF YOUR PROCEDURE:  1. Follow instructions for ARRIVAL TIME as DIRECTED BY YOUR SURGEON. I    2. Enter the MAIN entrance from [address] and follow the signs to the free [employer] or [employer] parking (offered free of charge 6am-5pm). 3. Enter the Main Entrance of the hospital (do not enter from the lower level of the parking garage). Upon entrance, check in with the  at the main desk on your left. If no one is available at the desk, proceed into the [facility] Waiting Room and go through the door directly into the [facility]. There is a Check-in desk ACROSS from Room 5 (marked with a sign hanging from the ceiling). The phone number for the surgery center is [phone]. 4. Please call [phone] option #2 option #2 if you have not been preregistered yet. On the day of your procedure bring your insurance card and photo ID.  You will be registered at your bedside once brought back to your room. 5. DO NOT EAT ANYTHING eight hours prior to surgery. May have 8 ounces of water 4 hours prior to surgery. 6. MEDICATIONS    Take the following medications with a SMALL sip of water: Amlodipne   Use your usual dose of inhalers the morning of surgery. BRING your rescue inhaler with you to hospital.    Anesthesia does NOT want you to take insulin the morning of surgery. They will control your blood sugar while you are at the hospital. Please contact your ordering physician for instructions regarding your insulin the night before your procedure. If you have an insulin pump, please keep it set on basal rate. 7. Do not swallow water when brushing teeth. No gum, candy, mints or ice chips. Refrain from smoking or at least decrease the amount. 8. Dress in loose, comfortable clothing appropriate for redressing after your procedure. Do not wear jewelry (including body piercings), make-up (especially NO eye make-up), fingernail polish (NO toenail polish if foot/leg surgery), lotion, powders or metal hairclips. 9. Dentures, glasses, or contacts will need to be removed before your procedure. Bring cases for your glasses, contacts, dentures, or hearing aids to protect them while you are in surgery. 10. If you use a CPAP, please bring it with you on the day of your procedure. 11. We recommend that valuable personal  belongings such as cash, cell phones, e-tablets or jewelry, be left at home during your stay. The hospital will not be responsible for valuables that are not secured in the hospital safe. However, if your insurance requires a co-pay, you may want to bring a method of payment, i.e. Check or credit card, if you wish to pay your co-pay the day of surgery. 12. If you are to stay overnight, you may bring a bag with personal items.  Please have any large items you may need brought in by your family after your arrival to your hospital room.    13. If you have a Living Will or Durable Power of , please bring a copy on the day of your procedure. 15. With your permission, one family member may accompany you while you are being prepared for surgery. Once you are ready, additional family members may join you. HOW WE KEEP YOU SAFE and WORK TO PREVENT SURGICAL SITE INFECTIONS:  1. Health care workers should always check your ID bracelet to verify your name and birth date. You will be asked many times to state your name, date of birth, and allergies. 2. Health care workers should always clean their hands with soap or alcohol gel before providing care to you. It is okay to ask anyone if they cleaned their hands before they touch you. 3. You will be actively involved in verifying the type of procedure you are having and ensuring the correct surgical site. This will be confirmed multiple times prior to your procedure. Do NOT manuel your surgery site UNLESS instructed to by your surgeon. 4. Do not shave or wax for 72 hours prior to procedure near your operative site. Shaving with a razor can irritate your skin and make it easier to develop an infection. On the day of your procedure, any hair that needs to be removed near the surgical site will be clipped by a healthcare worker using a special clippers designed to avoid skin irritation. 5. When you are in the operating room, your surgical site will be cleansed with a special soap, and in most cases, you will be given an antibiotic before the surgery begins. What to expect AFTER YOUR PROCEDURE:  1. Immediately following your procedure, your will be taken to the PACU for the first phase of your recovery. Your nurse will help you recover from any potential side effects of anesthesia, such as extreme drowsiness, changes in your vital signs or breathing patterns. Nausea, headache, muscle aches, or sore throat may also occur after anesthesia.   Your nurse will help you manage these potential side effects. 2. For comfort and safety, arrange to have someone at home with you for the first 24 hours after discharge. 3. You and your family will be given written instructions about your diet, activity, dressing care, medications, and return visits. 4. Once at home, should issues with nausea, pain, or bleeding occur, or should you notice any signs of infection, you should call your surgeon. 5. Always clean your hands before and after caring for your wound. Do not let your family touch your surgery site without cleaning their hands. 6. Narcotic pain medications can cause significant constipation. You may want to add a stool softener to your postoperative medication schedule or speak to your surgeon on how best to manage this SIDE EFFECT. SPECIAL INSTRUCTIONS     Thank you for allowing us to care for you. We strive to exceed your expectations in the delivery of care and service provided to you and your family. If you need to contact us for any reason, please call us at [phone]    Instructions reviewed with patient during preadmission testing phone interview. Luigi Xiong. 10/7/2020 .9:49 AM      ADDITIONAL EDUCATIONAL INFORMATION REVIEWED PER PHONE WITH YOU AND/OR YOUR FAMILY:  Yes Antibacterial Soap

## 2020-10-08 ENCOUNTER — HOSPITAL ENCOUNTER (OUTPATIENT)
Age: 65
Setting detail: OUTPATIENT SURGERY
Discharge: HOME | End: 2020-10-08
Attending: ORTHOPAEDIC SURGERY | Admitting: ORTHOPAEDIC SURGERY
Payer: COMMERCIAL

## 2020-10-08 ENCOUNTER — ANESTHESIA (OUTPATIENT)
Dept: OPERATING ROOM | Age: 65
End: 2020-10-08
Payer: COMMERCIAL

## 2020-10-08 VITALS — OXYGEN SATURATION: 93 % | SYSTOLIC BLOOD PRESSURE: 116 MMHG | DIASTOLIC BLOOD PRESSURE: 62 MMHG

## 2020-10-08 VITALS
DIASTOLIC BLOOD PRESSURE: 73 MMHG | HEART RATE: 82 BPM | SYSTOLIC BLOOD PRESSURE: 114 MMHG | BODY MASS INDEX: 28.34 KG/M2 | TEMPERATURE: 97.6 F | WEIGHT: 135 LBS | HEIGHT: 58 IN | RESPIRATION RATE: 17 BRPM | OXYGEN SATURATION: 93 %

## 2020-10-08 LAB
SARS-COV-2, NAA: NOT DETECTED
SARS-COV-2, NAAT: NOT DETECTED

## 2020-10-08 PROCEDURE — 3700000001 HC ADD 15 MINUTES (ANESTHESIA): Performed by: ORTHOPAEDIC SURGERY

## 2020-10-08 PROCEDURE — 3600000014 HC SURGERY LEVEL 4 ADDTL 15MIN: Performed by: ORTHOPAEDIC SURGERY

## 2020-10-08 PROCEDURE — 6360000002 HC RX W HCPCS: Performed by: NURSE ANESTHETIST, CERTIFIED REGISTERED

## 2020-10-08 PROCEDURE — 3700000000 HC ANESTHESIA ATTENDED CARE: Performed by: ORTHOPAEDIC SURGERY

## 2020-10-08 PROCEDURE — 7100000011 HC PHASE II RECOVERY - ADDTL 15 MIN: Performed by: ORTHOPAEDIC SURGERY

## 2020-10-08 PROCEDURE — 7100000010 HC PHASE II RECOVERY - FIRST 15 MIN: Performed by: ORTHOPAEDIC SURGERY

## 2020-10-08 PROCEDURE — 2580000003 HC RX 258: Performed by: ANESTHESIOLOGY

## 2020-10-08 PROCEDURE — 7100000000 HC PACU RECOVERY - FIRST 15 MIN: Performed by: ORTHOPAEDIC SURGERY

## 2020-10-08 PROCEDURE — 7100000001 HC PACU RECOVERY - ADDTL 15 MIN: Performed by: ORTHOPAEDIC SURGERY

## 2020-10-08 PROCEDURE — 6370000000 HC RX 637 (ALT 250 FOR IP): Performed by: ANESTHESIOLOGY

## 2020-10-08 PROCEDURE — 6360000002 HC RX W HCPCS: Performed by: ANESTHESIOLOGY

## 2020-10-08 PROCEDURE — 2500000003 HC RX 250 WO HCPCS: Performed by: NURSE ANESTHETIST, CERTIFIED REGISTERED

## 2020-10-08 PROCEDURE — C1713 ANCHOR/SCREW BN/BN,TIS/BN: HCPCS | Performed by: ORTHOPAEDIC SURGERY

## 2020-10-08 PROCEDURE — 76942 ECHO GUIDE FOR BIOPSY: CPT | Performed by: ANESTHESIOLOGY

## 2020-10-08 PROCEDURE — 2709999900 HC NON-CHARGEABLE SUPPLY: Performed by: ORTHOPAEDIC SURGERY

## 2020-10-08 PROCEDURE — 2580000003 HC RX 258: Performed by: ORTHOPAEDIC SURGERY

## 2020-10-08 PROCEDURE — U0002 COVID-19 LAB TEST NON-CDC: HCPCS

## 2020-10-08 PROCEDURE — 2720000010 HC SURG SUPPLY STERILE: Performed by: ORTHOPAEDIC SURGERY

## 2020-10-08 PROCEDURE — 3600000004 HC SURGERY LEVEL 4 BASE: Performed by: ORTHOPAEDIC SURGERY

## 2020-10-08 DEVICE — SCREW BNE L16MM DIA2.4MM DST RAD VOLAR S STL ST VAR ANG LOK: Type: IMPLANTABLE DEVICE | Site: WRIST | Status: FUNCTIONAL

## 2020-10-08 DEVICE — SCREW BNE L12MM DIA2.4MM DST RAD VOLAR S STL ST VAR ANG LOK: Type: IMPLANTABLE DEVICE | Site: WRIST | Status: FUNCTIONAL

## 2020-10-08 DEVICE — SCREW BNE L12MM DIA2.7MM CORT S STL ST T8 STARDRV RECESS: Type: IMPLANTABLE DEVICE | Site: WRIST | Status: FUNCTIONAL

## 2020-10-08 DEVICE — PLATE BNE W22XL54MM STD 9 H R DST RAD VOLAR S STL VAR ANG: Type: IMPLANTABLE DEVICE | Site: WRIST | Status: FUNCTIONAL

## 2020-10-08 RX ORDER — LIDOCAINE HYDROCHLORIDE 20 MG/ML
INJECTION, SOLUTION INTRAVENOUS PRN
Status: DISCONTINUED | OUTPATIENT
Start: 2020-10-08 | End: 2020-10-08 | Stop reason: SDUPTHER

## 2020-10-08 RX ORDER — 0.9 % SODIUM CHLORIDE 0.9 %
500 INTRAVENOUS SOLUTION INTRAVENOUS
Status: DISCONTINUED | OUTPATIENT
Start: 2020-10-08 | End: 2020-10-08 | Stop reason: HOSPADM

## 2020-10-08 RX ORDER — ONDANSETRON 2 MG/ML
4 INJECTION INTRAMUSCULAR; INTRAVENOUS
Status: DISCONTINUED | OUTPATIENT
Start: 2020-10-08 | End: 2020-10-08 | Stop reason: HOSPADM

## 2020-10-08 RX ORDER — MEPERIDINE HYDROCHLORIDE 25 MG/ML
12.5 INJECTION INTRAMUSCULAR; INTRAVENOUS; SUBCUTANEOUS EVERY 5 MIN PRN
Status: DISCONTINUED | OUTPATIENT
Start: 2020-10-08 | End: 2020-10-08 | Stop reason: HOSPADM

## 2020-10-08 RX ORDER — CHOLECALCIFEROL (VITAMIN D3) 1250 MCG
1 CAPSULE ORAL DAILY
COMMUNITY

## 2020-10-08 RX ORDER — HYDROCODONE BITARTRATE AND ACETAMINOPHEN 5; 325 MG/1; MG/1
1 TABLET ORAL
Status: DISCONTINUED | OUTPATIENT
Start: 2020-10-08 | End: 2020-10-08 | Stop reason: HOSPADM

## 2020-10-08 RX ORDER — PROPOFOL 10 MG/ML
INJECTION, EMULSION INTRAVENOUS PRN
Status: DISCONTINUED | OUTPATIENT
Start: 2020-10-08 | End: 2020-10-08 | Stop reason: SDUPTHER

## 2020-10-08 RX ORDER — SODIUM CHLORIDE 0.9 % (FLUSH) 0.9 %
10 SYRINGE (ML) INJECTION PRN
Status: DISCONTINUED | OUTPATIENT
Start: 2020-10-08 | End: 2020-10-08 | Stop reason: HOSPADM

## 2020-10-08 RX ORDER — CEFAZOLIN SODIUM 1 G/3ML
INJECTION, POWDER, FOR SOLUTION INTRAMUSCULAR; INTRAVENOUS PRN
Status: DISCONTINUED | OUTPATIENT
Start: 2020-10-08 | End: 2020-10-08 | Stop reason: SDUPTHER

## 2020-10-08 RX ORDER — MAGNESIUM HYDROXIDE 1200 MG/15ML
LIQUID ORAL CONTINUOUS PRN
Status: COMPLETED | OUTPATIENT
Start: 2020-10-08 | End: 2020-10-08

## 2020-10-08 RX ORDER — ROPIVACAINE HYDROCHLORIDE 5 MG/ML
INJECTION, SOLUTION EPIDURAL; INFILTRATION; PERINEURAL
Status: COMPLETED | OUTPATIENT
Start: 2020-10-08 | End: 2020-10-08

## 2020-10-08 RX ORDER — DEXAMETHASONE SODIUM PHOSPHATE 4 MG/ML
INJECTION, SOLUTION INTRA-ARTICULAR; INTRALESIONAL; INTRAMUSCULAR; INTRAVENOUS; SOFT TISSUE PRN
Status: DISCONTINUED | OUTPATIENT
Start: 2020-10-08 | End: 2020-10-08 | Stop reason: SDUPTHER

## 2020-10-08 RX ORDER — MIDAZOLAM HYDROCHLORIDE 1 MG/ML
INJECTION INTRAMUSCULAR; INTRAVENOUS
Status: COMPLETED
Start: 2020-10-08 | End: 2020-10-08

## 2020-10-08 RX ORDER — ROPIVACAINE HYDROCHLORIDE 5 MG/ML
INJECTION, SOLUTION EPIDURAL; INFILTRATION; PERINEURAL
Status: COMPLETED
Start: 2020-10-08 | End: 2020-10-08

## 2020-10-08 RX ORDER — APREPITANT 40 MG/1
40 CAPSULE ORAL ONCE
Status: COMPLETED | OUTPATIENT
Start: 2020-10-08 | End: 2020-10-08

## 2020-10-08 RX ORDER — PROCHLORPERAZINE EDISYLATE 5 MG/ML
5 INJECTION INTRAMUSCULAR; INTRAVENOUS
Status: DISCONTINUED | OUTPATIENT
Start: 2020-10-08 | End: 2020-10-08 | Stop reason: HOSPADM

## 2020-10-08 RX ORDER — ONDANSETRON 2 MG/ML
INJECTION INTRAMUSCULAR; INTRAVENOUS PRN
Status: DISCONTINUED | OUTPATIENT
Start: 2020-10-08 | End: 2020-10-08 | Stop reason: SDUPTHER

## 2020-10-08 RX ORDER — SCOLOPAMINE TRANSDERMAL SYSTEM 1 MG/1
1 PATCH, EXTENDED RELEASE TRANSDERMAL
Status: DISCONTINUED | OUTPATIENT
Start: 2020-10-08 | End: 2020-10-08 | Stop reason: HOSPADM

## 2020-10-08 RX ORDER — SODIUM CHLORIDE, SODIUM LACTATE, POTASSIUM CHLORIDE, CALCIUM CHLORIDE 600; 310; 30; 20 MG/100ML; MG/100ML; MG/100ML; MG/100ML
INJECTION, SOLUTION INTRAVENOUS CONTINUOUS
Status: DISCONTINUED | OUTPATIENT
Start: 2020-10-08 | End: 2020-10-08 | Stop reason: HOSPADM

## 2020-10-08 RX ORDER — MIDAZOLAM HYDROCHLORIDE 1 MG/ML
INJECTION INTRAMUSCULAR; INTRAVENOUS PRN
Status: DISCONTINUED | OUTPATIENT
Start: 2020-10-08 | End: 2020-10-08 | Stop reason: SDUPTHER

## 2020-10-08 RX ORDER — SODIUM CHLORIDE 9 MG/ML
INJECTION, SOLUTION INTRAVENOUS CONTINUOUS
Status: DISCONTINUED | OUTPATIENT
Start: 2020-10-08 | End: 2020-10-08 | Stop reason: HOSPADM

## 2020-10-08 RX ORDER — HYDRALAZINE HYDROCHLORIDE 20 MG/ML
5 INJECTION INTRAMUSCULAR; INTRAVENOUS EVERY 10 MIN PRN
Status: DISCONTINUED | OUTPATIENT
Start: 2020-10-08 | End: 2020-10-08 | Stop reason: HOSPADM

## 2020-10-08 RX ORDER — METOCLOPRAMIDE HYDROCHLORIDE 5 MG/ML
INJECTION INTRAMUSCULAR; INTRAVENOUS PRN
Status: DISCONTINUED | OUTPATIENT
Start: 2020-10-08 | End: 2020-10-08 | Stop reason: SDUPTHER

## 2020-10-08 RX ORDER — DIPHENHYDRAMINE HYDROCHLORIDE 50 MG/ML
12.5 INJECTION INTRAMUSCULAR; INTRAVENOUS
Status: DISCONTINUED | OUTPATIENT
Start: 2020-10-08 | End: 2020-10-08 | Stop reason: HOSPADM

## 2020-10-08 RX ORDER — DEXAMETHASONE SODIUM PHOSPHATE 4 MG/ML
INJECTION, SOLUTION INTRA-ARTICULAR; INTRALESIONAL; INTRAMUSCULAR; INTRAVENOUS; SOFT TISSUE
Status: DISCONTINUED
Start: 2020-10-08 | End: 2020-10-08 | Stop reason: HOSPADM

## 2020-10-08 RX ORDER — SODIUM CHLORIDE 0.9 % (FLUSH) 0.9 %
10 SYRINGE (ML) INJECTION EVERY 12 HOURS SCHEDULED
Status: DISCONTINUED | OUTPATIENT
Start: 2020-10-08 | End: 2020-10-08 | Stop reason: HOSPADM

## 2020-10-08 RX ADMIN — METOCLOPRAMIDE 10 MG: 5 INJECTION, SOLUTION INTRAMUSCULAR; INTRAVENOUS at 13:30

## 2020-10-08 RX ADMIN — CEFAZOLIN SODIUM 2000 MG: 1 POWDER, FOR SOLUTION INTRAMUSCULAR; INTRAVENOUS at 13:18

## 2020-10-08 RX ADMIN — DEXAMETHASONE SODIUM PHOSPHATE 4 MG: 4 INJECTION, SOLUTION INTRAMUSCULAR; INTRAVENOUS at 13:28

## 2020-10-08 RX ADMIN — PROPOFOL 100 MG: 10 INJECTION, EMULSION INTRAVENOUS at 13:07

## 2020-10-08 RX ADMIN — ROPIVACAINE HYDROCHLORIDE 30 ML: 5 INJECTION, SOLUTION EPIDURAL; INFILTRATION; PERINEURAL at 11:58

## 2020-10-08 RX ADMIN — SODIUM CHLORIDE, SODIUM LACTATE, POTASSIUM CHLORIDE, AND CALCIUM CHLORIDE: 600; 310; 30; 20 INJECTION, SOLUTION INTRAVENOUS at 12:28

## 2020-10-08 RX ADMIN — ONDANSETRON 4 MG: 2 INJECTION INTRAMUSCULAR; INTRAVENOUS at 14:28

## 2020-10-08 RX ADMIN — FAMOTIDINE 20 MG: 10 INJECTION, SOLUTION INTRAVENOUS at 13:31

## 2020-10-08 RX ADMIN — LIDOCAINE HYDROCHLORIDE 100 MG: 20 INJECTION, SOLUTION INTRAVENOUS at 13:07

## 2020-10-08 RX ADMIN — MIDAZOLAM HYDROCHLORIDE 2 MG: 2 INJECTION, SOLUTION INTRAMUSCULAR; INTRAVENOUS at 11:35

## 2020-10-08 RX ADMIN — SODIUM CHLORIDE, SODIUM LACTATE, POTASSIUM CHLORIDE, AND CALCIUM CHLORIDE: 600; 310; 30; 20 INJECTION, SOLUTION INTRAVENOUS at 12:06

## 2020-10-08 RX ADMIN — HYDROMORPHONE HYDROCHLORIDE 1 MG: 1 INJECTION, SOLUTION INTRAMUSCULAR; INTRAVENOUS; SUBCUTANEOUS at 11:37

## 2020-10-08 RX ADMIN — APREPITANT 40 MG: 40 CAPSULE ORAL at 12:06

## 2020-10-08 ASSESSMENT — PULMONARY FUNCTION TESTS
PIF_VALUE: 9
PIF_VALUE: 2
PIF_VALUE: 10
PIF_VALUE: 8
PIF_VALUE: 8
PIF_VALUE: 9
PIF_VALUE: 9
PIF_VALUE: 2
PIF_VALUE: 8
PIF_VALUE: 9
PIF_VALUE: 8
PIF_VALUE: 2
PIF_VALUE: 9
PIF_VALUE: 8
PIF_VALUE: 8
PIF_VALUE: 9
PIF_VALUE: 9
PIF_VALUE: 2
PIF_VALUE: 2
PIF_VALUE: 9
PIF_VALUE: 8
PIF_VALUE: 9
PIF_VALUE: 9
PIF_VALUE: 1
PIF_VALUE: 9
PIF_VALUE: 2
PIF_VALUE: 2
PIF_VALUE: 8
PIF_VALUE: 8
PIF_VALUE: 9
PIF_VALUE: 8
PIF_VALUE: 9
PIF_VALUE: 9
PIF_VALUE: 8
PIF_VALUE: 8
PIF_VALUE: 9
PIF_VALUE: 9
PIF_VALUE: 2
PIF_VALUE: 9
PIF_VALUE: 8
PIF_VALUE: 9
PIF_VALUE: 8
PIF_VALUE: 9
PIF_VALUE: 6
PIF_VALUE: 9
PIF_VALUE: 2
PIF_VALUE: 9
PIF_VALUE: 2
PIF_VALUE: 8
PIF_VALUE: 0
PIF_VALUE: 2
PIF_VALUE: 9
PIF_VALUE: 8
PIF_VALUE: 9
PIF_VALUE: 2
PIF_VALUE: 9
PIF_VALUE: 8
PIF_VALUE: 9
PIF_VALUE: 8
PIF_VALUE: 2
PIF_VALUE: 9
PIF_VALUE: 8
PIF_VALUE: 9
PIF_VALUE: 2
PIF_VALUE: 9
PIF_VALUE: 2
PIF_VALUE: 9
PIF_VALUE: 2
PIF_VALUE: 9
PIF_VALUE: 2
PIF_VALUE: 9
PIF_VALUE: 8
PIF_VALUE: 9
PIF_VALUE: 9
PIF_VALUE: 2
PIF_VALUE: 9

## 2020-10-08 ASSESSMENT — PAIN - FUNCTIONAL ASSESSMENT: PAIN_FUNCTIONAL_ASSESSMENT: 0-10

## 2020-10-08 ASSESSMENT — LIFESTYLE VARIABLES: SMOKING_STATUS: 1

## 2020-10-08 ASSESSMENT — PAIN DESCRIPTION - DESCRIPTORS: DESCRIPTORS: ACHING;CONSTANT;STABBING;BURNING

## 2020-10-08 ASSESSMENT — PAIN SCALES - GENERAL
PAINLEVEL_OUTOF10: 0

## 2020-10-08 NOTE — PROGRESS NOTES
Assisted Dr. Jacoby Williamson with axillary nerve block. Patient tolerated well. Vitals remained stable throughout procedure and post procedure. Patient on 2L O2 NC. Patient resting comfortably in bed. Bed in lowest position. Wheels locked. Call light within reach. Continuing to monitor.

## 2020-10-08 NOTE — OP NOTE
Dar Peterson MD  [address] [place] [address] Office: [address] [facility], [address], [facility]  [phone] ([phone]) [facility] [phone] ([phone]) [address] [facility] [place]  10/6/2020    Pre-operative Diagnosis:Right Distal radius fracture     Post-operative Diagnosis: same    Procedure:   1. Open reduction internal fixation of Right Distal radius fracture with 0 intra-articular fragments (CPT 81862)  2. Intraoperative fluoroscopy interpretation, with multiple views of Right wrist    Surgeon: Janae Haynes MD    Assistant:  Surgical Assistant: Ronda Song Holter  for the purposes of retraction, assistance for reduction, and manipulation of the limb, and assistance with wound closure as needed. Anesthesia:  MAC/Regional    Atibiotic: CEFAZOLIN, DOSING PER NURSING CHART    Complications:  None    ESTIMATED BLOOD LOSS:  10mL    Implant:   Implant Name Type Inv. Item Serial No.  Lot No. LRB No. Used Action   PLATE DISTL RAD FRANCISCO JAVIER ANGL RT 2.4X54MM Screw/Plate/Nail/Jose PLATE DISTL RAD FRANCISCO JAVIER ANGL RT 2.4X54MM  Criptext  Right 1 Implanted   SCREW CORTCL LK SLFTP T8 2.7X12MM Screw/Plate/Nail/Jose SCREW CORTCL LK SLFTP T8 2.7X12MM  SYNTHES  Right 3 Implanted   SCREW LK FRANCISCO JAVIER ANGL 2.4X16MM Screw/Plate/Nail/Jose SCREW LK FRANCISCO JAVIER ANGL 2.4X16MM  SYNTHES  Right 3 Implanted   SCREW LK FRANCISCO JAVIER ANGL 2.4X12MM Screw/Plate/Nail/Jose SCREW LK FRANCISCO JAVIER ANGL 2.4X12MM  Criptext  Right 1 Implanted       INDICATIONS FOR PROCEDURE: The patient sustained bilateral distal radius fractures at work and was followed in my clinic. The contralateral was treated surgically due to poor alignment, but the RIGHT was treated non-operatively in a cast given acceptable alignment. The RIGHT was checked under fluoroscopy and casted at the time of the contralateral surgery. At first post-op visit for the contralateral, the alignment was rechecked on the RIGHT and found to have angulated nearly 25-30 degrees dorsally.  We discussed thoroughly that 3+ weeks out from injury there would be significant consolidation. We discussed the natural history of this injury including the risk of arthritis with poor alignment. She is healthy and wants to remain active with heavy activities. I offered attempted closed reduction under anesthesia and possible open reduction with fixation to restore alignment. The patient understands the relevant risks, benefits, limitations, alternatives, and healing process of the procedure. PROCEDURE: The patient was given IV antibiotics, a regional block, and brought to the operating room and anesthetized with general anesthesia. The identified and marked extremity was then prepped and draped in a standard fashion. A well-padded tourniquet was applied to the upper arm. The arm was exsanguinated and the tourniquet elevated to 250 mmHg. Preoperative timeout was performed confirming the correct patient, procedure, laterality and other pertinent details and all present were in agreement. A volar radial incision was the made over the wrist.  Dissection carried through skin, subcutaneous tissue, and the FCR sheath. The pronator quadratus was elevated off the distal radius and the fracture line was identified. There was immature callus and consolidation, which freed up using a freer elevator. Osteotome was used as needed. A wire was placed subchondrally as a guide and the plate threaded over the wire. Single screw was placed in the oblong diaphyseal hole in the plate to holed longitudinal alignment but not tightened to allow the proximal aspect to sit high to use as a tool to restore volar tilt. Subchondral locking screws were placed in the distal fragment guided by fluoroscopy. Once secured, the proximal non-locking screws were placed to restore tilt, and provide compression across the fracture site.   Intraoperative xray demonstrated acceptable fracture and hardware position with restoration of volar tilt just past neutral. The wrist was taken through a range of motion. Stability of the wrist was tested and the DRUJ, radial-carpal, and intra-carpal joints were found to be satisfactory. It should be noted that 3 views of the operative Right wrist were performed with mini C-Arm fluoroscopy for the distal radius fracture. AP, lateral, and oblique views demonstrated satisfactory alignment of the fracture and hardware. The wounds were irrigated with sterile saline and hemostasis achieved with electrocautery. Closure was performed with subcutaneous 2-0 vicryl, and 3-0 Nylon at skin. A post-op dressing and volar splint was applied and the patient transported to the recovery area in stable condition with good perfusion to all fingertips. Post-Op Course: Follow-up in 10-14 days, for wound inspection and likely suture removal.  Transition to wrist brace at that time. No strengthening or lifting for 6-8 weeks.         Gustavo Marte MD  [facility]  Office: [phone]  Cell: [phone]    10/08/20  2:44 PM

## 2020-10-08 NOTE — FLOWSHEET NOTE
Patient received from the OR to pACU #7 post DISTAL RADIUS OPEN REDUCTION INTERNAL FIXATION - Right of Dr. Migdalia Trinidad. Placed on PACU monitoring equipment. Report given per CRNA. Per report, patient was stable during the procedure. On arrival, patient is still asleep from surgery with oral airway in place. No signs of pain or distress.

## 2020-10-08 NOTE — H&P
Nav Dickens    [id number]    [facility]  Department of General Surgery   Surgical Service   Pre-operative History and Physical  Last H & P within the last 30 days. DIAGNOSIS:   Other closed fracture of distal end of right radius, initial encounter [S52.055A]    Procedure(s):  RIGHT WRIST CLOSED REDUCTION AND CASTING VERSUS DISTAL RADIUS OPEN REDUCTION INTERNAL FIXATION     HISTORY OF PRESENT ILLNESS:   Right wrist pain after falling at work. The symptoms have been recalcitrant to conservative treatment and the patient presents today for the above procedure. Covid 19:  Patient denies fever, chills, cough or known exposure to Covid-19.   Patient reports they have been quarantined at home since Covid-19 test.      Past Medical History:        Diagnosis Date    CAD (coronary artery disease)     Cancer ([facility] [address]     breast    Dental bridge present     Dental crowns present     Osteoporosis     PONV (postoperative nausea and vomiting)      Past Surgical History:        Procedure Laterality Date    AXILLARY SURGERY Right     BREAST SURGERY Right     CORONARY ANGIOPLASTY WITH STENT PLACEMENT      DILATION AND CURETTAGE OF UTERUS      FOREARM SURGERY Left 9/18/2020    LEFT OPEN REDUCTION INTERNAL FIXATION DISTAL RADIUS; APPLICATION OF SHORT ARM CAST RIGHT ARM performed by Norma Menard MD at [address]  2020    LEG MASS EXCISION Left     PARTIAL NEPHRECTOMY Right 2018    TONSILLECTOMY AND ADENOIDECTOMY       Past Social History:  Social History     Socioeconomic History    Marital status:      Spouse name: None    Number of children: None    Years of education: None    Highest education level: None   Occupational History    None   Social Needs    Financial resource strain: None    Food insecurity     Worry: None     Inability: None    Transportation needs     Medical: None     Non-medical: None   Tobacco Use    Smoking status: Current Every Day Smoker     Packs/day: 1.00     Years: 30.00     Pack years: 30.00    Smokeless tobacco: Never Used   Substance and Sexual Activity    Alcohol use: No    Drug use: No    Sexual activity: None   Lifestyle    Physical activity     Days per week: None     Minutes per session: None    Stress: None   Relationships    Social connections     Talks on phone: None     Gets together: None     Attends Tenriism service: None     Active member of club or organization: None     Attends meetings of clubs or organizations: None     Relationship status: None    Intimate partner violence     Fear of current or ex partner: None     Emotionally abused: None     Physically abused: None     Forced sexual activity: None   Other Topics Concern    None   Social History Narrative    None         Medications Prior to Admission:      Prior to Admission medications    Medication Sig Start Date End Date Taking? Authorizing Provider   Cholecalciferol (VITAMIN D3) 1.25 MG (03892 UT) CAPS Take by mouth   Yes Historical Provider, MD   letrozole (FEMARA) 2.5 MG tablet Take 2.5 mg by mouth daily   Yes Historical Provider, MD   atorvastatin (LIPITOR) 10 MG tablet Take 10 mg by mouth daily   Yes Historical Provider, MD   amLODIPine (NORVASC) 5 MG tablet Take 5 mg by mouth daily   Yes Historical Provider, MD   ondansetron (ZOFRAN-ODT) 4 MG disintegrating tablet Take 1 tablet by mouth every 8 hours as needed for Nausea or Vomiting 9/18/20  Yes Roxanne Saldana MD   aspirin 81 MG EC tablet Take 81 mg by mouth daily    Historical Provider, MD   Denosumab (PROLIA SC) Inject 1 Syringe into the skin every 6 months    Historical Provider, MD         Allergies:  Clarithromycin; Anastrozole; Propofol; Codeine;  Hydrocodone-acetaminophen; and Oxycodone-acetaminophen    PHYSICAL EXAM:      /82   Pulse 76   Temp 98.2 °F (36.8 °C) (Oral)   Resp 16   Ht 4' 10\" (1.473 m)   Wt 135 lb (61.2 kg)   SpO2 98%   BMI 28.22 kg/m² Airway:  Airway patent with no audible stridor    Heart:  Regular rate and rhythm, No murmur noted    Lungs:  No increased work of breathing, good air exchange, clear to auscultation bilaterally, no crackles or wheezing    Abdomen:  Soft, non-distended, non-tender, normal active bowel sounds, no masses palpated    ASSESSMENT AND PLAN    Patient is a 76 y.o. female with above specified procedure planned. 1.  Patient seen and focused exam done today- no new changes since last physical exam on 9-    2. Access to ancillary services are available per request of the provider. 3.  Cast was cut off in preop.     Kenyon Abel     10/8/2020

## 2020-10-08 NOTE — BRIEF OP NOTE
Brief Postoperative Note      Patient: Uriah Middleton  YOB: 1955  MRN: [id number]    Date of Procedure: 10/8/2020    Pre-Op Diagnosis: Other closed fracture of distal end of right radius, initial encounter [S52.599Z]    Post-Op Diagnosis: Closed fracture of the distal end of the right radius, with malunion       Procedure(s):  DISTAL RADIUS OPEN REDUCTION INTERNAL FIXATION    Surgeon(s):  Michael Felton MD    Assistant:  Surgical Assistant: Dulcy Gallon Holter    Anesthesia: General    Estimated Blood Loss (mL): Minimal    Complications: None    Specimens:   * No specimens in log *    Implants:  * No implants in log *      Drains: * No LDAs found *    Findings: consolidation of the fracture fragments with immature callus, not mobile with closed manipulation    Electronically signed by Michael Felton MD on 10/8/2020 at 2:42 PM

## 2020-10-08 NOTE — ANESTHESIA POSTPROCEDURE EVALUATION
Department of Anesthesiology  Postprocedure Note    Patient: Rosalie Klein  MRN: [id number]  YOB: 1955  Date of evaluation: 10/8/2020  Time:  4:22 PM     Procedure Summary     Date:  10/08/20 Room / Location:  [address] [place] [address]    Anesthesia Start:  1300 Anesthesia Stop:  5646    Procedure:  DISTAL RADIUS OPEN REDUCTION INTERNAL FIXATION (Right Wrist) Diagnosis:       Other closed fracture of distal end of right radius, initial encounter      (Other closed fracture of distal end of right radius, initial encounter [S52.591A])    Surgeon:  Neftali Hicks MD Responsible Provider:  Jose J Geiger MD    Anesthesia Type:  general, regional ASA Status:  3          Anesthesia Type: general, regional    Huyen Phase I: Huyen Score: 10    Huyen Phase II: Huyen Score: 10    Last vitals: Reviewed and per EMR flowsheets.        Anesthesia Post Evaluation    Patient location during evaluation: PACU  Patient participation: complete - patient participated  Level of consciousness: awake  Pain score: 2  Airway patency: patent  Nausea & Vomiting: no nausea and no vomiting  Complications: no  Cardiovascular status: hemodynamically stable  Respiratory status: acceptable  Hydration status: euvolemic

## 2020-10-08 NOTE — ANESTHESIA PRE PROCEDURE
Department of Anesthesiology  Preprocedure Note       Name:  Reji Clemons   Age:  76 y.o.  :  1955                                          MRN:  [id number]         Date:  10/8/2020      Surgeon: Dyan Polo):  Yusef Frye MD    Procedure: Procedure(s):  RIGHT WRIST CLOSED REDUCTION AND CASTING VERSUS DISTAL RADIUS OPEN REDUCTION INTERNAL FIXATION    Medications prior to admission:   Prior to Admission medications    Medication Sig Start Date End Date Taking?  Authorizing Provider   Cholecalciferol (VITAMIN D3) 1.25 MG (31818 UT) CAPS Take by mouth   Yes Historical Provider, MD   letrozole (FEMARA) 2.5 MG tablet Take 2.5 mg by mouth daily   Yes Historical Provider, MD   atorvastatin (LIPITOR) 10 MG tablet Take 10 mg by mouth daily   Yes Historical Provider, MD   amLODIPine (NORVASC) 5 MG tablet Take 5 mg by mouth daily   Yes Historical Provider, MD   ondansetron (ZOFRAN-ODT) 4 MG disintegrating tablet Take 1 tablet by mouth every 8 hours as needed for Nausea or Vomiting 20  Yes Yusef Frye MD   aspirin 81 MG EC tablet Take 81 mg by mouth daily    Historical Provider, MD   Denosumab (PROLIA SC) Inject 1 Syringe into the skin every 6 months    Historical Provider, MD       Current medications:    Current Facility-Administered Medications   Medication Dose Route Frequency Provider Last Rate Last Dose    sodium chloride flush 0.9 % injection 10 mL  10 mL Intravenous 2 times per day Tellis Hearing, DO        sodium chloride flush 0.9 % injection 10 mL  10 mL Intravenous PRN Tellis Hearing, DO        0.9 % sodium chloride infusion   Intravenous Continuous Tellis Hearing, DO        lactated ringers infusion   Intravenous Continuous Tellis Hearing, DO        midazolam (VERSED) 2 MG/2ML injection             HYDROmorphone (DILAUDID) 1 MG/ML injection             ropivacaine (NAROPIN) 0.5% injection             dexamethasone (DECADRON) 4 MG/ML injection             HYDROmorphone (DILAUDID) injection 0.25 mg  0.25 mg Intravenous Q5 Min PRN Jacque Knight, DO        HYDROmorphone (DILAUDID) injection 0.5 mg  0.5 mg Intravenous Q5 Min PRN Jacque Knight, DO        HYDROcodone-acetaminophen [facility]) 5-325 MG per tablet 1 tablet  1 tablet Oral Once PRN Jacque Knight, DO        diphenhydrAMINE (BENADRYL) injection 12.5 mg  12.5 mg Intravenous Once PRN Jacque Knight, DO        0.9 % sodium chloride bolus  500 mL Intravenous Once PRN Jacque Knight, DO        ondansetron [facility]) injection 4 mg  4 mg Intravenous Once PRN Jacque Knight, DO        prochlorperazine (COMPAZINE) injection 5 mg  5 mg Intravenous Once PRN Jacque Knight, DO        hydrALAZINE (APRESOLINE) injection 5 mg  5 mg Intravenous Q10 Min PRN Jacque Knight, DO        meperidine (DEMEROL) injection 12.5 mg  12.5 mg Intravenous Q5 Min PRN Jacque Knight, DO           Allergies:     Allergies   Allergen Reactions    Clarithromycin Rash and Swelling     Blistering  Blisters on lips, tongue, mouth   Mouth and lips      Anastrozole Other (See Comments)     Allergic reaction    Propofol Nausea Only    Codeine Nausea Only and Nausea And Vomiting    Hydrocodone-Acetaminophen Nausea And Vomiting    Oxycodone-Acetaminophen Nausea And Vomiting       Problem List:    Patient Active Problem List   Diagnosis Code   (none) - all problems resolved or deleted       Past Medical History:        Diagnosis Date    CAD (coronary artery disease)     Cancer ([facility] [address]     breast    Dental bridge present     Dental crowns present     Osteoporosis     PONV (postoperative nausea and vomiting)        Past Surgical History:        Procedure Laterality Date    AXILLARY SURGERY Right     BREAST SURGERY Right     CORONARY ANGIOPLASTY WITH STENT PLACEMENT      DILATION AND CURETTAGE OF UTERUS      FOREARM SURGERY Left 9/18/2020    LEFT OPEN REDUCTION INTERNAL FIXATION DISTAL RADIUS; APPLICATION OF SHORT ARM CAST RIGHT ARM performed by Lab Results   Component Value Date    COVID19 Not Detected 10/08/2020    COVID19 NOT DETECTED 09/15/2020         Anesthesia Evaluation  Patient summary reviewed and Nursing notes reviewed   history of anesthetic complications: PONV. Airway: Mallampati: II  TM distance: >3 FB   Neck ROM: full  Mouth opening: > = 3 FB Dental:      Comment: Crowns   Upper     Pulmonary: breath sounds clear to auscultation  (+) current smoker (1 ppd x 30 yrs)          Patient did not smoke on day of surgery. Cardiovascular:  Exercise tolerance: good (>4 METS),   (+) past MI (2014  mi followed by stent ):, CAD:,     (-)  angina    NYHA Classification: II  ECG reviewed  Rhythm: regular  Rate: normal           Beta Blocker:  Not on Beta Blocker         Neuro/Psych:               GI/Hepatic/Renal:        (-) GERD       Endo/Other:    (+) malignancy/cancer (2015   breast ca   radiation only ). Abdominal:           Vascular:                                        Anesthesia Plan      general and regional     ASA 3       Induction: intravenous. MIPS: Postoperative opioids intended and Prophylactic antiemetics administered. Anesthetic plan and risks discussed with patient. Plan discussed with CRNA.     Attending anesthesiologist reviewed and agrees with Pre Eval content              Chiquis Wolff DO   10/8/2020

## 2020-10-08 NOTE — ANESTHESIA PROCEDURE NOTES
complications at the time of the procedure.       Medications Administered  Ropivacaine (NAROPIN) injection 0.5%, 30 mL  Reason for block: post-op pain management and at surgeon's request

## 2020-10-09 NOTE — RESULT ENCOUNTER NOTE
Your Covid-10 teste resulted not detected/negative. What happens if I have a negative test?    Remember to wash your hands often, avoid touching your face, stay 6 feet from people you do not live with, and wear a cloth facemask when you go out in public. A negative COVID-19 test at one point in time does not mean you will stay negative. You could become ill with COVID-19 and/or test positive at any time. If you are a close contact of a confirmed or suspected case, continue to stay home and away from others until 14 days after your last exposure. If you do not have symptoms, and were not in close contact with a confirmed or suspected case, you can stop isolating. If you currently have symptoms of COVID-19, and were not in close contact with a confirmed or suspected case, you should keep monitoring symptoms and talk to your doctor or other healthcare provider about staying home and if you need to get tested again. If you develop symptoms of COVID-19, stay at home and away from others and talk to your doctor or other healthcare provider about getting tested again. For additional information, visit coronavirus. [place].gov. For answers to your COVID-19 questions, call [phone]-[facility] ([phone]).

## 2021-08-31 RX ORDER — ACETAMINOPHEN 500 MG
1000 TABLET ORAL ONCE
Status: CANCELLED | OUTPATIENT
Start: 2021-08-31

## 2021-08-31 RX ORDER — GABAPENTIN 100 MG/1
100 CAPSULE ORAL 3 TIMES DAILY
Status: CANCELLED | OUTPATIENT
Start: 2021-08-31

## 2021-08-31 RX ORDER — CELECOXIB 100 MG/1
100 CAPSULE ORAL 2 TIMES DAILY
Status: CANCELLED | OUTPATIENT
Start: 2021-08-31

## 2021-08-31 NOTE — PROGRESS NOTES
[address] [facility] patients having surgery or anesthesia are required to be Covid tested OR to have been vaccinated at least 14 days prior to your procedure. It is very important to return our call to [phone] and notify the staff of your last vaccination date otherwise you will be required to complete Covid PCR test within the 5-6 days prior to surgery & quarantine. The results will need to be faxed to PreAdmission Testing at [phone]. PRIOR TO PROCEDURE DATE:        1. PLEASE FOLLOW ANY  GUIDELINES/ INSTRUCTIONS PRIOR TO YOUR PROCEDURE AS ADVISED BY YOUR SURGEON. 2. Arrange for someone to drive you home and be with you for the first 24 hours after discharge for your safety after your procedure for which you received sedation. Ensure it is someone we can share information with regarding your discharge. 3. You must contact your surgeon for instructions IF:   You are taking any blood thinners, aspirin, anti-inflammatory or vitamin E.   There is a change in your physical condition such as a cold, fever, rash, cuts, sores or any other infection, especially near your surgical site. 4. Do not drink alcohol the day before or day of your procedure. 5. A Pre-op History and Physical for surgery MUST be completed by your Physician or Urgent Care within 30 days of your procedure date. Please bring a copy with you on the day of your procedure and along with any other testing performed. THE DAY OF YOUR PROCEDURE:  1. Follow instructions for ARRIVAL TIME as DIRECTED BY YOUR SURGEON. 2. Enter the MAIN entrance from [address] and follow the signs to the free [employer] or [facility] [facility] parking (offered free of charge 6am-5pm). 3. Enter the Main Entrance of the hospital (do not enter from the lower level of the parking garage). Upon entrance, check in with the  at the main desk on your left. If no one is available at the desk, proceed into the [facility] Waiting Room and go through the door directly into the [facility]. There is a Check-in desk ACROSS from Room 5 (marked with a sign hanging from the ceiling). The phone number for the surgery center is [phone]. 4. Please call [phone] option #2 option #2 if you have not been preregistered yet. On the day of your procedure bring your insurance card and photo ID. You will be registered at your bedside once brought back to your room. 5. DO NOT EAT ANYTHING eight hours prior to your arrival for surgery. May have 8 ounces of water 4 hours prior to your arrival for surgery. NOTE: ALL Gastric, Bariatric and Bowel surgery patients MUST follow their surgeon's instructions. 6. MEDICATIONS    Take the following medications with a SMALL sip of water: AMLODIPINE   Bariatric patient's call surgeon if on diabetic medications as some need to be stopped 1 week preop   Use your usual dose of inhalers the morning of surgery. BRING your rescue inhaler with you to hospital.    Anesthesia does NOT want you to take insulin the morning of surgery. They will control your blood sugar while you are at the hospital. Please contact your ordering physician for instructions regarding your insulin the night before your procedure. If you have an insulin pump, please keep it set on basal rate. 7. Do not swallow water when brushing teeth. No gum, candy, mints or ice chips. Refrain from smoking or at least decrease the amount. 8. Dress in loose, comfortable clothing appropriate for redressing after your procedure. Do not wear jewelry (including body piercings), make-up (especially NO eye make-up), fingernail polish (NO toenail polish if foot/leg surgery), lotion, powders or metal hairclips. 9. Dentures, glasses, or contacts will need to be removed before your procedure.  Bring cases for your glasses, contacts, dentures, or hearing aids to protect them while you are in surgery. 10. If you use a CPAP, please bring it with you on the day of your procedure. 11. We recommend that valuable personal  belongings such as cash, cell phones, e-tablets or jewelry, be left at home during your stay. The hospital will not be responsible for valuables that are not secured in the hospital safe. However, if your insurance requires a co-pay, you may want to bring a method of payment, i.e. Check or credit card, if you wish to pay your co-pay the day of surgery. 12. If you are to stay overnight, you may bring a bag with personal items. Please have any large items you may need brought in by your family after your arrival to your hospital room. 15. If you have a Living Will or Durable Power of , please bring a copy on the day of your procedure. 15. With your permission, one family member may accompany you while you are being prepared for surgery. Once you are ready, additional family members may join you. HOW WE KEEP YOU SAFE and WORK TO PREVENT SURGICAL SITE INFECTIONS:  1. Health care workers should always check your ID bracelet to verify your name and birth date. You will be asked many times to state your name, date of birth, and allergies. 2. Health care workers should always clean their hands with soap or alcohol gel before providing care to you. It is okay to ask anyone if they cleaned their hands before they touch you. 3. You will be actively involved in verifying the type of procedure you are having and ensuring the correct surgical site. This will be confirmed multiple times prior to your procedure. Do NOT manuel your surgery site UNLESS instructed to by your surgeon. 4. Do not shave or wax for 72 hours prior to procedure near your operative site. Shaving with a razor can irritate your skin and make it easier to develop an infection.  On the day of your procedure, any hair that needs to be removed near the surgical site will be WITH YOU AND/OR YOUR FAMILY:  No Hibiclens® Bathing Instructions   Yes Antibacterial Soap

## 2021-09-03 NOTE — PROGRESS NOTES
9-3 36 Spoke with Aurora at Dr. Sandra Grimes office.  Requested office note, cadiac clearance and EKG tracing. /eg

## 2021-09-08 ENCOUNTER — APPOINTMENT (OUTPATIENT)
Dept: GENERAL RADIOLOGY | Age: 66
End: 2021-09-08
Attending: ORTHOPAEDIC SURGERY
Payer: COMMERCIAL

## 2021-09-08 ENCOUNTER — ANESTHESIA EVENT (OUTPATIENT)
Dept: OPERATING ROOM | Age: 66
End: 2021-09-08
Payer: COMMERCIAL

## 2021-09-08 ENCOUNTER — HOSPITAL ENCOUNTER (OUTPATIENT)
Age: 66
Setting detail: OUTPATIENT SURGERY
Discharge: HOME | End: 2021-09-08
Attending: ORTHOPAEDIC SURGERY | Admitting: ORTHOPAEDIC SURGERY
Payer: COMMERCIAL

## 2021-09-08 ENCOUNTER — ANESTHESIA (OUTPATIENT)
Dept: OPERATING ROOM | Age: 66
End: 2021-09-08
Payer: COMMERCIAL

## 2021-09-08 VITALS
RESPIRATION RATE: 13 BRPM | SYSTOLIC BLOOD PRESSURE: 93 MMHG | TEMPERATURE: 97 F | OXYGEN SATURATION: 97 % | DIASTOLIC BLOOD PRESSURE: 50 MMHG

## 2021-09-08 VITALS
RESPIRATION RATE: 18 BRPM | SYSTOLIC BLOOD PRESSURE: 126 MMHG | HEART RATE: 97 BPM | TEMPERATURE: 97.4 F | HEIGHT: 59 IN | DIASTOLIC BLOOD PRESSURE: 81 MMHG | WEIGHT: 141 LBS | BODY MASS INDEX: 28.43 KG/M2 | OXYGEN SATURATION: 98 %

## 2021-09-08 DIAGNOSIS — S62.102D CLOSED FRACTURE OF LEFT WRIST WITH ROUTINE HEALING, SUBSEQUENT ENCOUNTER: ICD-10-CM

## 2021-09-08 DIAGNOSIS — G56.02 LEFT CARPAL TUNNEL SYNDROME: Primary | ICD-10-CM

## 2021-09-08 PROCEDURE — 7100000001 HC PACU RECOVERY - ADDTL 15 MIN: Performed by: ORTHOPAEDIC SURGERY

## 2021-09-08 PROCEDURE — 6370000000 HC RX 637 (ALT 250 FOR IP): Performed by: ORTHOPAEDIC SURGERY

## 2021-09-08 PROCEDURE — 3600000004 HC SURGERY LEVEL 4 BASE: Performed by: ORTHOPAEDIC SURGERY

## 2021-09-08 PROCEDURE — 73100 X-RAY EXAM OF WRIST: CPT

## 2021-09-08 PROCEDURE — 6360000002 HC RX W HCPCS: Performed by: ANESTHESIOLOGY

## 2021-09-08 PROCEDURE — 2580000003 HC RX 258: Performed by: ORTHOPAEDIC SURGERY

## 2021-09-08 PROCEDURE — 3700000000 HC ANESTHESIA ATTENDED CARE: Performed by: ORTHOPAEDIC SURGERY

## 2021-09-08 PROCEDURE — 7100000011 HC PHASE II RECOVERY - ADDTL 15 MIN: Performed by: ORTHOPAEDIC SURGERY

## 2021-09-08 PROCEDURE — 2500000003 HC RX 250 WO HCPCS: Performed by: NURSE ANESTHETIST, CERTIFIED REGISTERED

## 2021-09-08 PROCEDURE — 6370000000 HC RX 637 (ALT 250 FOR IP): Performed by: ANESTHESIOLOGY

## 2021-09-08 PROCEDURE — 2500000003 HC RX 250 WO HCPCS: Performed by: ANESTHESIOLOGY

## 2021-09-08 PROCEDURE — 3600000014 HC SURGERY LEVEL 4 ADDTL 15MIN: Performed by: ORTHOPAEDIC SURGERY

## 2021-09-08 PROCEDURE — 3700000001 HC ADD 15 MINUTES (ANESTHESIA): Performed by: ORTHOPAEDIC SURGERY

## 2021-09-08 PROCEDURE — 6360000002 HC RX W HCPCS: Performed by: NURSE ANESTHETIST, CERTIFIED REGISTERED

## 2021-09-08 PROCEDURE — 2580000003 HC RX 258: Performed by: NURSE ANESTHETIST, CERTIFIED REGISTERED

## 2021-09-08 PROCEDURE — 6360000002 HC RX W HCPCS: Performed by: ORTHOPAEDIC SURGERY

## 2021-09-08 PROCEDURE — 7100000010 HC PHASE II RECOVERY - FIRST 15 MIN: Performed by: ORTHOPAEDIC SURGERY

## 2021-09-08 PROCEDURE — 64415 NJX AA&/STRD BRCH PLXS IMG: CPT | Performed by: ANESTHESIOLOGY

## 2021-09-08 PROCEDURE — 2709999900 HC NON-CHARGEABLE SUPPLY: Performed by: ORTHOPAEDIC SURGERY

## 2021-09-08 PROCEDURE — 7100000000 HC PACU RECOVERY - FIRST 15 MIN: Performed by: ORTHOPAEDIC SURGERY

## 2021-09-08 RX ORDER — OXYCODONE HYDROCHLORIDE AND ACETAMINOPHEN 5; 325 MG/1; MG/1
1 TABLET ORAL
Status: DISCONTINUED | OUTPATIENT
Start: 2021-09-08 | End: 2021-09-08 | Stop reason: HOSPADM

## 2021-09-08 RX ORDER — SCOLOPAMINE TRANSDERMAL SYSTEM 1 MG/1
1 PATCH, EXTENDED RELEASE TRANSDERMAL ONCE
Status: COMPLETED | OUTPATIENT
Start: 2021-09-08 | End: 2021-09-08

## 2021-09-08 RX ORDER — LABETALOL HYDROCHLORIDE 5 MG/ML
5 INJECTION, SOLUTION INTRAVENOUS EVERY 10 MIN PRN
Status: DISCONTINUED | OUTPATIENT
Start: 2021-09-08 | End: 2021-09-08 | Stop reason: HOSPADM

## 2021-09-08 RX ORDER — ONDANSETRON 2 MG/ML
4 INJECTION INTRAMUSCULAR; INTRAVENOUS
Status: COMPLETED | OUTPATIENT
Start: 2021-09-08 | End: 2021-09-08

## 2021-09-08 RX ORDER — LIDOCAINE HYDROCHLORIDE 10 MG/ML
INJECTION, SOLUTION INFILTRATION; PERINEURAL
Status: DISCONTINUED
Start: 2021-09-08 | End: 2021-09-08 | Stop reason: HOSPADM

## 2021-09-08 RX ORDER — LIDOCAINE HYDROCHLORIDE 20 MG/ML
INJECTION, SOLUTION INFILTRATION; PERINEURAL PRN
Status: DISCONTINUED | OUTPATIENT
Start: 2021-09-08 | End: 2021-09-08 | Stop reason: SDUPTHER

## 2021-09-08 RX ORDER — DEXAMETHASONE SODIUM PHOSPHATE 10 MG/ML
INJECTION, SOLUTION INTRAMUSCULAR; INTRAVENOUS PRN
Status: DISCONTINUED | OUTPATIENT
Start: 2021-09-08 | End: 2021-09-08 | Stop reason: SDUPTHER

## 2021-09-08 RX ORDER — FENTANYL CITRATE 50 UG/ML
INJECTION, SOLUTION INTRAMUSCULAR; INTRAVENOUS PRN
Status: DISCONTINUED | OUTPATIENT
Start: 2021-09-08 | End: 2021-09-08 | Stop reason: SDUPTHER

## 2021-09-08 RX ORDER — MIDAZOLAM HYDROCHLORIDE 1 MG/ML
INJECTION INTRAMUSCULAR; INTRAVENOUS
Status: COMPLETED
Start: 2021-09-08 | End: 2021-09-08

## 2021-09-08 RX ORDER — ACETAMINOPHEN 325 MG/1
650 TABLET ORAL ONCE
Status: COMPLETED | OUTPATIENT
Start: 2021-09-08 | End: 2021-09-08

## 2021-09-08 RX ORDER — MAGNESIUM HYDROXIDE 1200 MG/15ML
LIQUID ORAL CONTINUOUS PRN
Status: COMPLETED | OUTPATIENT
Start: 2021-09-08 | End: 2021-09-08

## 2021-09-08 RX ORDER — CELECOXIB 200 MG/1
400 CAPSULE ORAL ONCE
Status: COMPLETED | OUTPATIENT
Start: 2021-09-08 | End: 2021-09-08

## 2021-09-08 RX ORDER — OXYCODONE HYDROCHLORIDE 5 MG/1
5 TABLET ORAL EVERY 6 HOURS PRN
Qty: 28 TABLET | Refills: 0 | Status: SHIPPED | OUTPATIENT
Start: 2021-09-08 | End: 2021-09-15

## 2021-09-08 RX ORDER — SODIUM CHLORIDE, SODIUM LACTATE, POTASSIUM CHLORIDE, CALCIUM CHLORIDE 600; 310; 30; 20 MG/100ML; MG/100ML; MG/100ML; MG/100ML
INJECTION, SOLUTION INTRAVENOUS CONTINUOUS PRN
Status: DISCONTINUED | OUTPATIENT
Start: 2021-09-08 | End: 2021-09-08 | Stop reason: SDUPTHER

## 2021-09-08 RX ORDER — APREPITANT 40 MG/1
40 CAPSULE ORAL ONCE
Status: COMPLETED | OUTPATIENT
Start: 2021-09-08 | End: 2021-09-08

## 2021-09-08 RX ORDER — FENTANYL CITRATE 50 UG/ML
25 INJECTION, SOLUTION INTRAMUSCULAR; INTRAVENOUS EVERY 5 MIN PRN
Status: DISCONTINUED | OUTPATIENT
Start: 2021-09-08 | End: 2021-09-08 | Stop reason: HOSPADM

## 2021-09-08 RX ORDER — AMOXICILLIN 250 MG
2 CAPSULE ORAL DAILY PRN
Qty: 30 TABLET | Refills: 2 | Status: ON HOLD | OUTPATIENT
Start: 2021-09-08 | End: 2022-03-30 | Stop reason: ALTCHOICE

## 2021-09-08 RX ORDER — PROMETHAZINE HYDROCHLORIDE 25 MG/ML
6.25 INJECTION, SOLUTION INTRAMUSCULAR; INTRAVENOUS
Status: DISCONTINUED | OUTPATIENT
Start: 2021-09-08 | End: 2021-09-08 | Stop reason: HOSPADM

## 2021-09-08 RX ORDER — HYDRALAZINE HYDROCHLORIDE 20 MG/ML
5 INJECTION INTRAMUSCULAR; INTRAVENOUS EVERY 10 MIN PRN
Status: DISCONTINUED | OUTPATIENT
Start: 2021-09-08 | End: 2021-09-08 | Stop reason: HOSPADM

## 2021-09-08 RX ORDER — DEXAMETHASONE SODIUM PHOSPHATE 4 MG/ML
INJECTION, SOLUTION INTRA-ARTICULAR; INTRALESIONAL; INTRAMUSCULAR; INTRAVENOUS; SOFT TISSUE PRN
Status: DISCONTINUED | OUTPATIENT
Start: 2021-09-08 | End: 2021-09-08 | Stop reason: SDUPTHER

## 2021-09-08 RX ORDER — LIDOCAINE HYDROCHLORIDE 10 MG/ML
5 INJECTION, SOLUTION INFILTRATION; PERINEURAL ONCE
Status: DISCONTINUED | OUTPATIENT
Start: 2021-09-08 | End: 2021-09-08 | Stop reason: HOSPADM

## 2021-09-08 RX ORDER — PROPOFOL 10 MG/ML
INJECTION, EMULSION INTRAVENOUS CONTINUOUS PRN
Status: DISCONTINUED | OUTPATIENT
Start: 2021-09-08 | End: 2021-09-08 | Stop reason: SDUPTHER

## 2021-09-08 RX ORDER — FENTANYL CITRATE 50 UG/ML
50 INJECTION, SOLUTION INTRAMUSCULAR; INTRAVENOUS EVERY 5 MIN PRN
Status: DISCONTINUED | OUTPATIENT
Start: 2021-09-08 | End: 2021-09-08 | Stop reason: HOSPADM

## 2021-09-08 RX ORDER — FENTANYL CITRATE 50 UG/ML
INJECTION, SOLUTION INTRAMUSCULAR; INTRAVENOUS
Status: COMPLETED
Start: 2021-09-08 | End: 2021-09-08

## 2021-09-08 RX ORDER — MIDAZOLAM HYDROCHLORIDE 1 MG/ML
INJECTION INTRAMUSCULAR; INTRAVENOUS PRN
Status: DISCONTINUED | OUTPATIENT
Start: 2021-09-08 | End: 2021-09-08 | Stop reason: SDUPTHER

## 2021-09-08 RX ORDER — ROPIVACAINE HYDROCHLORIDE 5 MG/ML
INJECTION, SOLUTION EPIDURAL; INFILTRATION; PERINEURAL
Status: COMPLETED
Start: 2021-09-08 | End: 2021-09-08

## 2021-09-08 RX ORDER — DEXAMETHASONE SODIUM PHOSPHATE 10 MG/ML
INJECTION, SOLUTION INTRAMUSCULAR; INTRAVENOUS
Status: COMPLETED
Start: 2021-09-08 | End: 2021-09-08

## 2021-09-08 RX ORDER — MEPERIDINE HYDROCHLORIDE 25 MG/ML
12.5 INJECTION INTRAMUSCULAR; INTRAVENOUS; SUBCUTANEOUS EVERY 5 MIN PRN
Status: DISCONTINUED | OUTPATIENT
Start: 2021-09-08 | End: 2021-09-08 | Stop reason: HOSPADM

## 2021-09-08 RX ORDER — GABAPENTIN 300 MG/1
300 CAPSULE ORAL ONCE
Status: COMPLETED | OUTPATIENT
Start: 2021-09-08 | End: 2021-09-08

## 2021-09-08 RX ORDER — ROPIVACAINE HYDROCHLORIDE 5 MG/ML
INJECTION, SOLUTION EPIDURAL; INFILTRATION; PERINEURAL PRN
Status: DISCONTINUED | OUTPATIENT
Start: 2021-09-08 | End: 2021-09-08 | Stop reason: SDUPTHER

## 2021-09-08 RX ADMIN — DEXAMETHASONE SODIUM PHOSPHATE 4 MG: 4 INJECTION, SOLUTION INTRAMUSCULAR; INTRAVENOUS at 10:10

## 2021-09-08 RX ADMIN — PROPOFOL 150 MCG/KG/MIN: 10 INJECTION, EMULSION INTRAVENOUS at 10:02

## 2021-09-08 RX ADMIN — MIDAZOLAM HYDROCHLORIDE 2 MG: 2 INJECTION, SOLUTION INTRAMUSCULAR; INTRAVENOUS at 09:30

## 2021-09-08 RX ADMIN — SCOPALAMINE 1 PATCH: 1 PATCH, EXTENDED RELEASE TRANSDERMAL at 09:50

## 2021-09-08 RX ADMIN — FAMOTIDINE 20 MG: 10 INJECTION, SOLUTION INTRAVENOUS at 09:36

## 2021-09-08 RX ADMIN — DEXAMETHASONE SODIUM PHOSPHATE 8 MG: 10 INJECTION, SOLUTION INTRAMUSCULAR; INTRAVENOUS at 09:30

## 2021-09-08 RX ADMIN — CELECOXIB 400 MG: 200 CAPSULE ORAL at 08:16

## 2021-09-08 RX ADMIN — GABAPENTIN 300 MG: 300 CAPSULE ORAL at 08:16

## 2021-09-08 RX ADMIN — FENTANYL CITRATE 25 MCG: 50 INJECTION, SOLUTION INTRAMUSCULAR; INTRAVENOUS at 10:22

## 2021-09-08 RX ADMIN — ONDANSETRON 4 MG: 2 INJECTION INTRAMUSCULAR; INTRAVENOUS at 10:05

## 2021-09-08 RX ADMIN — VANCOMYCIN HYDROCHLORIDE 1000 MG: 10 INJECTION, POWDER, LYOPHILIZED, FOR SOLUTION INTRAVENOUS at 10:05

## 2021-09-08 RX ADMIN — LIDOCAINE HYDROCHLORIDE 100 MG: 20 INJECTION, SOLUTION INFILTRATION; PERINEURAL at 10:02

## 2021-09-08 RX ADMIN — APREPITANT 40 MG: 40 CAPSULE ORAL at 09:36

## 2021-09-08 RX ADMIN — FENTANYL CITRATE 100 MCG: 50 INJECTION, SOLUTION INTRAMUSCULAR; INTRAVENOUS at 09:30

## 2021-09-08 RX ADMIN — ROPIVACAINE HYDROCHLORIDE 30 ML: 5 INJECTION, SOLUTION EPIDURAL; INFILTRATION; PERINEURAL at 09:30

## 2021-09-08 RX ADMIN — SODIUM CHLORIDE, SODIUM LACTATE, POTASSIUM CHLORIDE, AND CALCIUM CHLORIDE: .6; .31; .03; .02 INJECTION, SOLUTION INTRAVENOUS at 09:53

## 2021-09-08 RX ADMIN — FENTANYL CITRATE 25 MCG: 50 INJECTION, SOLUTION INTRAMUSCULAR; INTRAVENOUS at 10:14

## 2021-09-08 RX ADMIN — ACETAMINOPHEN 650 MG: 325 TABLET ORAL at 08:16

## 2021-09-08 ASSESSMENT — PULMONARY FUNCTION TESTS
PIF_VALUE: 0
PIF_VALUE: 0
PIF_VALUE: 1
PIF_VALUE: 0
PIF_VALUE: 0
PIF_VALUE: 1
PIF_VALUE: 0
PIF_VALUE: 1
PIF_VALUE: 1
PIF_VALUE: 0
PIF_VALUE: 1
PIF_VALUE: 0
PIF_VALUE: 1
PIF_VALUE: 0
PIF_VALUE: 1
PIF_VALUE: 0
PIF_VALUE: 0
PIF_VALUE: 1
PIF_VALUE: 0
PIF_VALUE: 1
PIF_VALUE: 0
PIF_VALUE: 0
PIF_VALUE: 1
PIF_VALUE: 0
PIF_VALUE: 0
PIF_VALUE: 1

## 2021-09-08 ASSESSMENT — PAIN SCALES - GENERAL
PAINLEVEL_OUTOF10: 0

## 2021-09-08 ASSESSMENT — LIFESTYLE VARIABLES: SMOKING_STATUS: 1

## 2021-09-08 ASSESSMENT — PAIN - FUNCTIONAL ASSESSMENT: PAIN_FUNCTIONAL_ASSESSMENT: 0-10

## 2021-09-08 NOTE — PROGRESS NOTES
Ambulatory Surgery/Procedure Discharge Note    Vitals:    09/08/21 1250   BP: 126/81   Pulse: 97   Resp: 18   Temp: 97.4 °F (36.3 °C)   SpO2: 98%     Patient arrived to Phase II recovery Alert and Oriented x4. Vitals stable. Patient denies pain. No nausea or vomiting.  at bedside. Discharge instructions reviewed with patient and . Both verbalize understanding. In: 1380 [P.O.:240; I.V.:890]  Out: -     Restroom use offered before discharge. Yes    Pain assessment:  none  Pain Level: 0        Patient discharged to home/self care. Patient discharged via wheel chair home with .       9/8/2021 1:32 PM

## 2021-09-08 NOTE — PROGRESS NOTES
Assisted Dr. Tommy Alvarez with nerve block. Patient tolerated well. Vitals stable. Patient resting comfortably in bed. Call light in reach.

## 2021-09-08 NOTE — PROGRESS NOTES
Admitted to pacu from OR 14. Awake. Pt has congested cough. Is a 2 ppd smoker. O2 sat 98%. Report given by CRNA. VSS on monitor.

## 2021-09-08 NOTE — BRIEF OP NOTE
Brief Postoperative Note      Patient: Thomas Marin  YOB: 1955  MRN: [id number]    Date of Procedure: 9/8/2021    Pre-Op Diagnosis: Other closed fracture of distal end of left radius, initial encounter [S52.252P]    Post-Op Diagnosis: Same       Procedure(s):  LEFT CARPAL TUNNEL RELEASE/ LEFT WRIST / HAND HARDWARE REMOVAL  .     Surgeon(s):  Israel John MD    Assistant:  Surgical Assistant: Joe Cunningham    Anesthesia: Choice    Estimated Blood Loss (mL): 31QZ    Complications: None    Specimens:   * No specimens in log *    Implants:  * No implants in log *      Drains: * No LDAs found *    Findings: plate removal and carpal tunnel release    Electronically signed by Israel John MD on 9/8/2021 at 10:29 AM

## 2021-09-08 NOTE — PROGRESS NOTES
PACU Transfer to [facility]    Vitals:    09/08/21 1207   BP: (!) 155/79   Pulse: 58   Resp: 15   Temp: 97.4 °F (36.3 °C)   SpO2: 100%     BP WNL for phase one dc    Intake/Output Summary (Last 24 hours) at 9/8/2021 1216  Last data filed at 9/8/2021 1207  Gross per 24 hour   Intake 1380 ml   Output --   Net 1380 ml       Pain assessment:  present - adequately treated  Pain Level: 0    Patient transferred to care of PAULINA RN.    9/8/2021 12:16 PM

## 2021-09-08 NOTE — PROGRESS NOTES
Patient admission completed. Patient can't have IV in right arm due to lymph node excision with breast cancer in the past. Spoke to surgeon and he declined having IV in left arm. Patient consented to starting IV in foot. Tried to start IV in both feet with no success. Informed anesthesia.

## 2021-09-08 NOTE — ANESTHESIA PRE PROCEDURE
Department of Anesthesiology  Preprocedure Note       Name:  Lilo Toribio   Age:  76 y.o.  :  1955                                          MRN:  [id number]         Date:  2021      Surgeon: Zev Zavaleta):  Xochitl Shetty MD    Procedure: Procedure(s):  LEFT CARPAL TUNNEL RELEASE/ LEFT WRIST / HAND HARDWARE REMOVAL  . Medications prior to admission:   Prior to Admission medications    Medication Sig Start Date End Date Taking? Authorizing Provider   oxyCODONE (ROXICODONE) 5 MG immediate release tablet Take 1 tablet by mouth every 6 hours as needed for Pain (half tab for mild pain, whole tab for severe) for up to 7 days. Intended supply: 7 days. Take lowest dose possible to manage pain 9/8/21 9/15/21  Xochitl Shetty MD   senna-docusate (PERICOLACE) 8.6-50 MG per tablet Take 2 tablets by mouth daily as needed for Constipation 21   Xochitl Shetty MD   Cholecalciferol (VITAMIN D3) 1.25 MG (44709 UT) CAPS Take by mouth    Historical Provider, MD   atorvastatin (LIPITOR) 10 MG tablet Take 10 mg by mouth daily    Historical Provider, MD   amLODIPine (NORVASC) 5 MG tablet Take 5 mg by mouth daily    Historical Provider, MD   aspirin 81 MG EC tablet Take 81 mg by mouth daily    Historical Provider, MD   ondansetron (ZOFRAN-ODT) 4 MG disintegrating tablet Take 1 tablet by mouth every 8 hours as needed for Nausea or Vomiting 20   Xochitl Shetty MD       Current medications:    No current facility-administered medications for this visit. Current Outpatient Medications   Medication Sig Dispense Refill    oxyCODONE (ROXICODONE) 5 MG immediate release tablet Take 1 tablet by mouth every 6 hours as needed for Pain (half tab for mild pain, whole tab for severe) for up to 7 days. Intended supply: 7 days.  Take lowest dose possible to manage pain 28 tablet 0    senna-docusate (PERICOLACE) 8.6-50 MG per tablet Take 2 tablets by mouth daily as needed for Constipation 30 tablet 2     Facility-Administered Medications Ordered in Other Visits   Medication Dose Route Frequency Provider Last Rate Last Admin    ceFAZolin (ANCEF) 2000 mg in dextrose 5 % 50 mL IVPB  2,000 mg IntraVENous Once Gerardo Delarosa MD        vancomycin [facility]) 1,000 mg in dextrose 5 % 250 mL IVPB  15 mg/kg IntraVENous Once Gerardo Delarosa MD        famotidine (PEPCID) injection 20 mg  20 mg IntraVENous Once Geraline Helm, DO        aprepitant (EMEND) capsule 40 mg  40 mg Oral Once Geraline Helm, DO        lidocaine 1 % injection             midazolam (VERSED) 2 MG/2ML injection             fentaNYL (SUBLIMAZE) 100 MCG/2ML injection             ropivacaine (NAROPIN) 0.5% injection             dexamethasone (PF) (DECADRON) 10 MG/ML injection                Allergies:     Allergies   Allergen Reactions    Clarithromycin Rash and Swelling     Blistering  Blisters on lips, tongue, mouth   Mouth and lips      Anastrozole Other (See Comments)     Allergic reaction    Propofol Nausea Only    Codeine Nausea Only and Nausea And Vomiting    Hydrocodone-Acetaminophen Nausea And Vomiting    Oxycodone-Acetaminophen Nausea And Vomiting       Problem List:    Patient Active Problem List   Diagnosis Code   (none) - all problems resolved or deleted       Past Medical History:        Diagnosis Date    CAD (coronary artery disease)     Cancer ([facility] [address]     breast RIGHT    Dental bridge present     Dental crowns present     Hyperlipidemia     Hypertension     Osteoporosis     PONV (postoperative nausea and vomiting)        Past Surgical History:        Procedure Laterality Date    AXILLARY SURGERY Right     BREAST SURGERY Right     CORONARY ANGIOPLASTY WITH STENT PLACEMENT      DILATION AND CURETTAGE OF UTERUS      FOREARM SURGERY Left 9/18/2020    LEFT OPEN REDUCTION INTERNAL FIXATION DISTAL RADIUS; APPLICATION OF SHORT ARM CAST RIGHT [facility]    DISTAL RADIUS OPEN REDUCTION INTERNAL FIXATION performed by Janae Haynes MD at [address]  2020    LEG MASS EXCISION Left     PARTIAL NEPHRECTOMY Right 2018    TONSILLECTOMY AND ADENOIDECTOMY         Social History:    Social History     Tobacco Use    Smoking status: Current Every Day Smoker     Packs/day: 1.00     Years: 30.00     Pack years: 30.00    Smokeless tobacco: Never Used   Substance Use Topics    Alcohol use: No                                Ready to quit: Not Answered  Counseling given: Not Answered      Vital Signs (Current): There were no vitals filed for this visit. BP Readings from Last 3 Encounters:   09/08/21 128/69   10/08/20 116/62   10/08/20 114/73       NPO Status:                                                                                 BMI:   Wt Readings from Last 3 Encounters:   09/08/21 141 lb (64 kg)   10/08/20 135 lb (61.2 kg)   09/18/20 146 lb (66.2 kg)     There is no height or weight on file to calculate BMI.    CBC: No results found for: WBC, RBC, HGB, HCT, MCV, RDW, PLT    CMP:   Lab Results   Component Value Date    CO2 24 10/20/2014    CREATININE 1.1 10/20/2014    GFRAA >60 10/20/2014    LABGLOM 54 10/20/2014       POC Tests: No results for input(s): POCGLU, POCNA, POCK, POCCL, POCBUN, POCHEMO, POCHCT in the last 72 hours.     Coags: No results found for: PROTIME, INR, APTT    HCG (If Applicable): No results found for: PREGTESTUR, PREGSERUM, HCG, HCGQUANT     ABGs: No results found for: PHART, PO2ART, XDE0ZOK, ROR1VIS, BEART, K8DEMTDB     Type & Screen (If Applicable):  No results found for: LABABO, LABRH    Drug/Infectious Status (If Applicable):  No results found for: HIV, HEPCAB    COVID-19 Screening (If Applicable):   Lab Results   Component Value Date    COVID19 Not Detected 10/08/2020    COVID19 NOT DETECTED 10/06/2020 Anesthesia Evaluation  Patient summary reviewed and Nursing notes reviewed   history of anesthetic complications: PONV. Airway: Mallampati: II  TM distance: >3 FB   Neck ROM: full  Mouth opening: > = 3 FB Dental:      Comment: Crowns   Upper     Pulmonary: breath sounds clear to auscultation  (+) current smoker (1 ppd x 30 yrs)          Patient did not smoke on day of surgery. Cardiovascular:  Exercise tolerance: good (>4 METS),   (+) past MI (2014  mi followed by stent ):, CAD:,     (-)  angina    NYHA Classification: II  ECG reviewed  Rhythm: regular  Rate: normal           Beta Blocker:  Not on Beta Blocker         Neuro/Psych:               GI/Hepatic/Renal:        (-) GERD       Endo/Other:    (+) malignancy/cancer (2015 breast ca radiation only ). Abdominal:             Vascular: Other Findings:               Anesthesia Plan      general, regional and TIVA     ASA 3     (Supraclavicular nerve block )  Induction: intravenous. MIPS: Postoperative opioids intended and Prophylactic antiemetics administered. Anesthetic plan and risks discussed with patient. Plan discussed with CRNA.                 Gustavo Sifuentes DO   9/8/2021

## 2021-09-08 NOTE — ANESTHESIA PROCEDURE NOTES
Peripheral Block    Patient location during procedure: pre-op  Staffing  Performed: anesthesiologist   Preanesthetic Checklist  Completed: patient identified, IV checked, site marked, risks and benefits discussed, surgical consent, monitors and equipment checked, pre-op evaluation, timeout performed, anesthesia consent given, oxygen available and patient being monitored  Peripheral Block  Patient position: supine  Prep: ChloraPrep  Patient monitoring: cardiac monitor, continuous pulse ox, continuous capnometry, frequent blood pressure checks and IV access  Block type: Brachial plexus  Laterality: left  Injection technique: single-shot  Guidance: ultrasound guided  Needle  Needle gauge: 22 G  Needle localization: anatomical landmarks and ultrasound guidance  Assessment  Injection assessment: negative aspiration for heme, no paresthesia on injection and local visualized surrounding nerve on ultrasound  Paresthesia pain: none  Slow fractionated injection: yes  Hemodynamics: stable  Additional Notes  Sterile prep. Timeout with SDS RN. 2 mg versed and 100 micrograms fentanyl administered for pt comfort. 30 mL 0.5% Ropivacaine + 8 mg PF decadron injected in 5 mL increments following negative aspiration. Tip of needle in view at all times. No pain or paresthesias on injection. Pt tolerated the procedure well.    Reason for block: post-op pain management and at surgeon's request

## 2021-09-08 NOTE — ANESTHESIA POSTPROCEDURE EVALUATION
Department of Anesthesiology  Postprocedure Note    Patient: Shelley Hernandez  MRN: [id number]  YOB: 1955  Date of evaluation: 9/8/2021  Time:  1:44 PM     Procedure Summary     Date: 09/08/21 Room / Location: [address] [place] [address] / [facility]    Anesthesia Start: 9778 Anesthesia Stop: 1101    Procedures:       LEFT CARPAL TUNNEL RELEASE/ LEFT WRIST / HAND HARDWARE REMOVAL (Left Hand)      . (Left Wrist) Diagnosis:       Other closed fracture of distal end of left radius, initial encounter      (Other closed fracture of distal end of left radius, initial encounter [S52.592A])    Surgeons: Sridhar Ding MD Responsible Provider: Jeanne Walter DO    Anesthesia Type: general, regional ASA Status: 3          Anesthesia Type: general, regional    Huyen Phase I: Huyen Score: 10    Huyen Phase II: Huyen Score: 10    Last vitals: Reviewed and per EMR flowsheets.        Anesthesia Post Evaluation    Patient location during evaluation: PACU  Patient participation: complete - patient participated  Level of consciousness: awake and alert  Pain score: 0  Airway patency: patent  Nausea & Vomiting: no nausea and no vomiting  Cardiovascular status: blood pressure returned to baseline  Respiratory status: acceptable  Hydration status: euvolemic

## 2021-09-08 NOTE — H&P
Roro Oshea    [id number]    [facility] Same Day Surgery Update H & P  Department of General Surgery   Surgical Service   Pre-operative History and Physical  Last H & P within the last 30 days. DIAGNOSIS:   Other closed fracture of distal end of left radius, initial encounter [S52.886Q]    Procedure(s):  LEFT CARPAL TUNNEL RELEASE/ LEFT WRIST / HAND HARDWARE REMOVAL  . History obtained from: Patient interview and EHR      HISTORY OF PRESENT ILLNESS:   Patient with left hand pain, numbness and tingling in the setting of retained orthopedic hardware from distal radius fracture. Covid 19:  Patient denies fever, chills, worsening cough, or known exposure to Covid-19.        Past Medical History:        Diagnosis Date    CAD (coronary artery disease)     Cancer ([facility] [address]     breast RIGHT    Dental bridge present     Dental crowns present     Hyperlipidemia     Hypertension     Osteoporosis     PONV (postoperative nausea and vomiting)      Past Surgical History:        Procedure Laterality Date    AXILLARY SURGERY Right     BREAST SURGERY Right     CORONARY ANGIOPLASTY WITH STENT PLACEMENT      DILATION AND CURETTAGE OF UTERUS      FOREARM SURGERY Left 9/18/2020    LEFT OPEN REDUCTION INTERNAL FIXATION DISTAL RADIUS; APPLICATION OF SHORT ARM CAST RIGHT ARM performed by Yasmine Bajwa MD at [address] Right 10/8/2020    DISTAL RADIUS OPEN REDUCTION INTERNAL FIXATION performed by Yasmine Bajwa MD at [address]  2020    LEG MASS EXCISION Left     PARTIAL NEPHRECTOMY Right 2018    TONSILLECTOMY AND ADENOIDECTOMY       Past Social History:  Social History     Socioeconomic History    Marital status:      Spouse name: None    Number of children: None    Years of education: None    Highest education level: None   Occupational History    None   Tobacco Use    Smoking status: Current Every Day Smoker     Packs/day: 1.00     Years: 30.00 Pack years: 30.00    Smokeless tobacco: Never Used   Vaping Use    Vaping Use: Never used   Substance and Sexual Activity    Alcohol use: No    Drug use: No    Sexual activity: None   Other Topics Concern    None   Social History Narrative    None     Social Determinants of Health     Financial Resource Strain:     Difficulty of Paying Living Expenses:    Food Insecurity:     Worried About Running Out of Food in the Last Year:     Ran Out of Food in the Last Year:    Transportation Needs:     Lack of Transportation (Medical):  Lack of Transportation (Non-Medical):    Physical Activity:     Days of Exercise per Week:     Minutes of Exercise per Session:    Stress:     Feeling of Stress :    Social Connections:     Frequency of Communication with Friends and Family:     Frequency of Social Gatherings with Friends and Family:     Attends Cheondo[employer] Services:     Active Member of Clubs or Organizations:     Attends Club or Organization Meetings:     Marital Status:    Intimate Partner Violence:     Fear of Current or Ex-Partner:     Emotionally Abused:     Physically Abused:     Sexually Abused:          Medications Prior to Admission:      Prior to Admission medications    Medication Sig Start Date End Date Taking?  Authorizing Provider   Cholecalciferol (VITAMIN D3) 1.25 MG (61881 UT) CAPS Take by mouth   Yes Historical Provider, MD   atorvastatin (LIPITOR) 10 MG tablet Take 10 mg by mouth daily   Yes Historical Provider, MD   amLODIPine (NORVASC) 5 MG tablet Take 5 mg by mouth daily   Yes Historical Provider, MD   aspirin 81 MG EC tablet Take 81 mg by mouth daily   Yes Historical Provider, MD   ondansetron (ZOFRAN-ODT) 4 MG disintegrating tablet Take 1 tablet by mouth every 8 hours as needed for Nausea or Vomiting 9/18/20  Yes Heri Zelaya MD   Denosumab (PROLIA SC) Inject 1 Syringe into the skin every 6 months    Historical Provider, MD         Allergies:  Clarithromycin, Anastrozole, Propofol, Codeine, Hydrocodone-acetaminophen, and Oxycodone-acetaminophen    PHYSICAL EXAM:      /69   Pulse 71   Temp 97.9 °F (36.6 °C) (Temporal)   Resp 14   Ht 4' 11\" (1.499 m)   Wt 141 lb (64 kg)   SpO2 96%   BMI 28.48 kg/m²      Airway:  Airway patent with no audible stridor    Heart:  Regular rate and rhythm, No murmur noted    Lungs:  No increased work of breathing, good air exchange, clear to auscultation bilaterally, no crackles or wheezing    Abdomen:  Soft, non-distended, non-tender, no rebound tenderness or guarding, and no masses palpated    ASSESSMENT AND PLAN     Patient is a 76 y.o. female with above specified procedure planned. 1.  The patients history and physical was obtained and signed off by the pre-admission testing department. Patient seen and focused exam done today- no new changes since last physical exam on 8/30/21    2. Access to ancillary services are available per request of the provider.     BRISSA Horton - DOMINIC     9/8/2021

## 2021-09-21 NOTE — OP NOTE
Operative Note      Patient: Ingrid Almonte  YOB: 1955  MRN: [id number]    Date of Procedure: 9/8/2021    Pre-Op Diagnosis: Other closed fracture of distal end of left radius, initial encounter [S52.325V]    Post-Op Diagnosis: Same       Procedure(s):  LEFT CARPAL TUNNEL RELEASE/ LEFT WRIST / HAND HARDWARE REMOVAL  . Surgeon(s):  Juan Caldera MD    Assistant:   Surgical Assistant: Tomeka Murcia    Anesthesia: Choice    Estimated Blood Loss (mL): less than 50     Complications: None    Specimens:   * No specimens in log *    Implants:  * No implants in log *      Drains: * No LDAs found *    Findings: healed fracture, constricted median nerve    Detailed Description of Procedure:   Patient was met in the preoperative holding area. The operative extremity was marked. We reviewed the history, physical and reviewed risks, benefits and alternatives to the surgical procedure including the risk of bleeding, infection, damage surrounding structures and need for possible future surgeries. The patient was in agreement with proceeding with surgery. Patient was brought into the operating theater, placed supine on the OR table where anesthesia was induced. The patient was prepped and draped in the supine position with all bony prominences well-padded. Was prepped in the usual sterile fashion. Preoperative timeout was performed confirming the correct patient, procedure, laterality and other pertinent details and all present were in agreement    Tourniquet was inflated to 250 mmHg for total time of approximately 30 minutes    Prior volar approach to the distal radius was utilized. Prior incision was utilized carried full-thickness flaps down to the FCR tendon sheath. FCR was retracted ulnarly with care to protect the radial artery on the radial aspect. FPL was retracted and pronator was elevated. Late was exposed. Proximal and distal screws were removed with no complication.   Plate was removed. The fracture was examined and found to be solid and fully united at that time. This was irrigated. Tender was then turned to the carpal tunnel release. A standard incision was made in the palm of the hand in line with the 3rd web space. Dissection carried down through skin, subcutaneous tissue, and palmar fascia. The transverse carpal ligament was identified and incised proximally. A groove director was inserted to protect the contents of the carpal tunnel at all times. After distal release was performed, attention was directed proximally. Under direct visualization the proximal aspect was also released. A fingertip could be inserted to insure adequate proximal and distal decompression. The contents of the carpal tunnel were examined and found to be consistent with changes of median nerve compression. Hemostasis achieved with bipolar electrocautery. Tourniquet was let down. Hemostasis was obtained. The wounds were irrigated with sterile saline. Skin was closed with 4-0 nylon suture. A soft, bulky dressing was applied and the patient transported to the recovery area in stable condition with good perfusion to all fingertips. Were wounds were dressed with dry sterile dressing. And a well-padded volar splint was applied patient was awoken from anesthesia, transferred to hospital bed and subsequently the PACU in stable condition. All counts were correct x2 at the end of the case. I was present scrubbed for all portions the procedure except closure.       Electronically signed by Marleni Tucker MD on 9/21/2021 at 3:42 PM

## 2022-03-21 NOTE — PROGRESS NOTES
Place patient label inside box (if no patient label, complete below)  Name:  :  MR#:   Alexandra Lopez / PROCEDURE  1. I (we)Javan  authorize [address] [place] [address]  and/or such assistants as may be selected by him/her, to perform the following operation/procedure(s): RIGHT WRIST HARDWARE REMOVAL/ CARPAL TUNNEL RELEASE        Note: If unable to obtain consent prior to an emergent procedure, document the emergent reason in the medical record. This procedure has been explained to my (our) satisfaction and included in the explanation was:  A) The intended benefit, nature, and extent of the procedure to be performed;  B) The significant risks involved and the probability of success;  C) Alternative procedures and methods of treatment;  D) The dangers and probable consequences of such alternatives (including no procedure or treatment); E) The expected consequences of the procedure on my future health;  F) Whether other qualified individuals would be performing important surgical tasks and/or whether  would be present to advise or support the procedure. I (we) understand that there are other risks of infection and other serious complications in the pre-operative/procedural and postoperative/procedural stages of my (our) care. I (we) have asked all of the questions which I (we) thought were important in deciding whether or not to undergo treatment or diagnosis. These questions have been answered to my (our) satisfaction. I (we) understand that no assurance can be given that the procedure will be a success, and no guarantee or warranty of success has been given to me (us). 2. It has been explained to me (us) that during the course of the operation/procedure, unforeseen conditions may be revealed that necessitate extension of the original procedure(s) or different procedure(s) than those set forth in Paragraph 1.  I (we) authorize and request that the above-named physician, his/her assistants or his/her designees, perform procedures as necessary and desirable if deemed to be in my (our) best interest.     Revised 8/2/2021                                                                          Page 1 of 2           3. I acknowledge that health care personnel may be observing this procedure for the purpose of medical education or other specified purposes as may be necessary as requested and/or approved by my (our) physician. 4. I (we) consent to the disposal by the hospital Pathologist of the removed tissue, parts or organs in accordance with hospital policy. 5. I do ____ do not ____ consent to the use of a local infiltration pain blocking agent that will be used by my provider/surgical provider to help alleviate pain during my procedure. 6. I do ____ do not ____ consent to an emergent blood transfusion in the case of a life-threatening situation that requires blood components to be administered. This consent is valid for 24 hours from the beginning of the procedure. 7. This patient does ____ or does not ____ currently have a DNR status/order. If DNR order is in place, obtain Addendum to the Surgical Consent for ALL Patients with a DNR Order to address fernando-operative status for limited intervention or DNR suspension.      8. I have read and fully understand the above Consent for Operation/Procedure and that all blanks were completed before I signed the consent.   _____________________________       _____________________      ____/____am/pm  Signature of Patient or legal representative      Printed Name / Relationship            Date / Time   ____________________________       _____________________      ____/____am/pm  Witness to Signature                                    Printed Name                    Date / Time     If patient is unable to sign or is a minor, complete the following)  Patient is a minor, ____ years of age, or unable to sign because:   ______________________________________________________________________________________________    Everlene Michael If a phone consent is obtained, consent will be documented by using two health care professionals, each affirming that the consenting party has no questions and gives consent for the procedure discussed with the physician/provider.   _____________________          ____________________       _____/_____am/pm   2nd witness to phone consent        Printed name           Date / Time    Informed Consent:  I have provided the explanation described above in section 1 to the patient and/or legal representative.  I have provided the patient and/or legal representative with an opportunity to ask any questions about the proposed operation/procedure.   ___________________________          ____________________         ____/____am/pm  Provider / Proceduralist                            Printed name            Date / Time  Revised 8/2/2021                                                                      Page 2 of 2

## 2022-03-21 NOTE — PROGRESS NOTES
[facility] patients having surgery or anesthesia are required to be Covid tested OR to have been vaccinated at least 14 days prior to your procedure. It is very important to return our call to [phone] and notify the staff of your last vaccination date otherwise you will be required to complete Covid PCR test within the 5-6 days prior to surgery & quarantine. The results will need to be faxed to PreAdmission Testing at [phone]. PRIOR TO PROCEDURE DATE:        1. PLEASE FOLLOW ANY  GUIDELINES/ INSTRUCTIONS PRIOR TO YOUR PROCEDURE AS ADVISED BY YOUR SURGEON. 2. Arrange for someone to drive you home and be with you for the first 24 hours after discharge for your safety after your procedure for which you received sedation. Ensure it is someone we can share information with regarding your discharge. 3. You must contact your surgeon for instructions IF:   You are taking any blood thinners, aspirin, anti-inflammatory or vitamin E.   There is a change in your physical condition such as a cold, fever, rash, cuts, sores or any other infection, especially near your surgical site. 4. Do not drink alcohol the day before or day of your procedure. 5. A Pre-op History and Physical for surgery MUST be completed by your Physician or Urgent Care within 30 days of your procedure date. Please bring a copy with you on the day of your procedure and along with any other testing performed. THE DAY OF YOUR PROCEDURE:  1. Follow instructions for ARRIVAL TIME as DIRECTED BY YOUR SURGEON. 2. Enter the MAIN entrance from [employer] and follow the signs to the free [employer] or [employer] parking (offered free of charge 6am-5pm). 3. Enter the Main Entrance of the hospital (do not enter from the lower level of the parking garage). Upon entrance, check in with the  at the main desk on your left. If no one is available at the desk, proceed into the [facility] Waiting Room and go through the door directly into the [facility]. There is a Check-in desk ACROSS from Room 5 (marked with a sign hanging from the ceiling). The phone number for the surgery center is [phone]. 4. Please call [phone] option #2 option #2 if you have not been preregistered yet. On the day of your procedure bring your insurance card and photo ID. You will be registered at your bedside once brought back to your room. 5. DO NOT EAT ANYTHING eight hours prior to your arrival for surgery. May have 8 ounces of water 4 hours prior to your arrival for surgery. NOTE: ALL Gastric, Bariatric and Bowel surgery patients MUST follow their surgeon's instructions. 6. MEDICATIONS    Take the following medications with a SMALL sip of water: amlodipine   Bariatric patient's call surgeon if on diabetic medications as some need to be stopped 1 week preop   Use your usual dose of inhalers the morning of surgery. BRING your rescue inhaler with you to hospital.    Anesthesia does NOT want you to take insulin the morning of surgery. They will control your blood sugar while you are at the hospital. Please contact your ordering physician for instructions regarding your insulin the night before your procedure. If you have an insulin pump, please keep it set on basal rate. 7. Do not swallow water when brushing teeth. No gum, candy, mints or ice chips. Refrain from smoking or at least decrease the amount. 8. Dress in loose, comfortable clothing appropriate for redressing after your procedure. Do not wear jewelry (including body piercings), make-up (especially NO eye make-up), fingernail polish (NO toenail polish if foot/leg surgery), lotion, powders or metal hairclips. 9. Dentures, glasses, or contacts will need to be removed before your procedure.  Bring cases for your glasses, contacts, dentures, or hearing aids to protect them while you are in surgery. 10. If you use a CPAP, please bring it with you on the day of your procedure. 11. We recommend that valuable personal  belongings such as cash, cell phones, e-tablets or jewelry, be left at home during your stay. The hospital will not be responsible for valuables that are not secured in the hospital safe. However, if your insurance requires a co-pay, you may want to bring a method of payment, i.e. Check or credit card, if you wish to pay your co-pay the day of surgery. 12. If you are to stay overnight, you may bring a bag with personal items. Please have any large items you may need brought in by your family after your arrival to your hospital room. 15. If you have a Living Will or Durable Power of , please bring a copy on the day of your procedure. 15. With your permission, one family member may accompany you while you are being prepared for surgery. Once you are ready, additional family members may join you. HOW WE KEEP YOU SAFE and WORK TO PREVENT SURGICAL SITE INFECTIONS:  1. Health care workers should always check your ID bracelet to verify your name and birth date. You will be asked many times to state your name, date of birth, and allergies. 2. Health care workers should always clean their hands with soap or alcohol gel before providing care to you. It is okay to ask anyone if they cleaned their hands before they touch you. 3. You will be actively involved in verifying the type of procedure you are having and ensuring the correct surgical site. This will be confirmed multiple times prior to your procedure. Do NOT manuel your surgery site UNLESS instructed to by your surgeon. 4. Do not shave or wax for 72 hours prior to procedure near your operative site. Shaving with a razor can irritate your skin and make it easier to develop an infection.  On the day of your procedure, any hair that needs to be removed near the surgical site will be clipped by a healthcare worker using a special clippers designed to avoid skin irritation. 5. When you are in the operating room, your surgical site will be cleansed with a special soap, and in most cases, you will be given an antibiotic before the surgery begins. What to expect AFTER YOUR PROCEDURE:  1. Immediately following your procedure, your will be taken to the PACU for the first phase of your recovery. Your nurse will help you recover from any potential side effects of anesthesia, such as extreme drowsiness, changes in your vital signs or breathing patterns. Nausea, headache, muscle aches, or sore throat may also occur after anesthesia. Your nurse will help you manage these potential side effects. 2. For comfort and safety, arrange to have someone at home with you for the first 24 hours after discharge. 3. You and your family will be given written instructions about your diet, activity, dressing care, medications, and return visits. 4. Once at home, should issues with nausea, pain, or bleeding occur, or should you notice any signs of infection, you should call your surgeon. 5. Always clean your hands before and after caring for your wound. Do not let your family touch your surgery site without cleaning their hands. 6. Narcotic pain medications can cause significant constipation. You may want to add a stool softener to your postoperative medication schedule or speak to your surgeon on how best to manage this SIDE EFFECT. SPECIAL INSTRUCTIONS   Thank you for allowing us to care for you. We strive to exceed your expectations in the delivery of care and service provided to you and your family. If you need to contact the [place] [address] staff for any reason, please call us at [phone]    Instructions reviewed with patient during preadmission testing phone interview.   Arabella Ledesma RN.3/21/2022 .11:30 AM      ADDITIONAL EDUCATIONAL INFORMATION REVIEWED PER PHONE WITH YOU AND/OR YOUR FAMILY:    Yes Antibacterial Soap

## 2022-03-29 ENCOUNTER — ANESTHESIA EVENT (OUTPATIENT)
Dept: OPERATING ROOM | Age: 67
End: 2022-03-29
Payer: COMMERCIAL

## 2022-03-30 ENCOUNTER — HOSPITAL ENCOUNTER (OUTPATIENT)
Age: 67
Setting detail: OUTPATIENT SURGERY
Discharge: HOME | End: 2022-03-30
Attending: ORTHOPAEDIC SURGERY | Admitting: ORTHOPAEDIC SURGERY
Payer: COMMERCIAL

## 2022-03-30 ENCOUNTER — ANESTHESIA (OUTPATIENT)
Dept: OPERATING ROOM | Age: 67
End: 2022-03-30
Payer: COMMERCIAL

## 2022-03-30 VITALS — OXYGEN SATURATION: 98 % | TEMPERATURE: 98 F | SYSTOLIC BLOOD PRESSURE: 100 MMHG | DIASTOLIC BLOOD PRESSURE: 61 MMHG

## 2022-03-30 VITALS
OXYGEN SATURATION: 94 % | BODY MASS INDEX: 27.54 KG/M2 | RESPIRATION RATE: 69 BRPM | HEIGHT: 59 IN | SYSTOLIC BLOOD PRESSURE: 96 MMHG | HEART RATE: 68 BPM | WEIGHT: 136.6 LBS | DIASTOLIC BLOOD PRESSURE: 68 MMHG | TEMPERATURE: 97.2 F

## 2022-03-30 DIAGNOSIS — Z97.8 ORTHOPEDIC HARDWARE PRESENT: Primary | ICD-10-CM

## 2022-03-30 PROCEDURE — 2500000003 HC RX 250 WO HCPCS: Performed by: NURSE ANESTHETIST, CERTIFIED REGISTERED

## 2022-03-30 PROCEDURE — 6360000002 HC RX W HCPCS: Performed by: NURSE ANESTHETIST, CERTIFIED REGISTERED

## 2022-03-30 PROCEDURE — 3700000001 HC ADD 15 MINUTES (ANESTHESIA): Performed by: ORTHOPAEDIC SURGERY

## 2022-03-30 PROCEDURE — 6360000002 HC RX W HCPCS: Performed by: ANESTHESIOLOGY

## 2022-03-30 PROCEDURE — 64415 NJX AA&/STRD BRCH PLXS IMG: CPT | Performed by: ANESTHESIOLOGY

## 2022-03-30 PROCEDURE — 7100000000 HC PACU RECOVERY - FIRST 15 MIN: Performed by: ORTHOPAEDIC SURGERY

## 2022-03-30 PROCEDURE — 2709999900 HC NON-CHARGEABLE SUPPLY: Performed by: ORTHOPAEDIC SURGERY

## 2022-03-30 PROCEDURE — 7100000011 HC PHASE II RECOVERY - ADDTL 15 MIN: Performed by: ORTHOPAEDIC SURGERY

## 2022-03-30 PROCEDURE — 6360000002 HC RX W HCPCS: Performed by: ORTHOPAEDIC SURGERY

## 2022-03-30 PROCEDURE — 2580000003 HC RX 258: Performed by: ANESTHESIOLOGY

## 2022-03-30 PROCEDURE — 2580000003 HC RX 258: Performed by: ORTHOPAEDIC SURGERY

## 2022-03-30 PROCEDURE — 3700000000 HC ANESTHESIA ATTENDED CARE: Performed by: ORTHOPAEDIC SURGERY

## 2022-03-30 PROCEDURE — 3600000014 HC SURGERY LEVEL 4 ADDTL 15MIN: Performed by: ORTHOPAEDIC SURGERY

## 2022-03-30 PROCEDURE — 3600000004 HC SURGERY LEVEL 4 BASE: Performed by: ORTHOPAEDIC SURGERY

## 2022-03-30 PROCEDURE — A4217 STERILE WATER/SALINE, 500 ML: HCPCS | Performed by: ORTHOPAEDIC SURGERY

## 2022-03-30 PROCEDURE — 7100000001 HC PACU RECOVERY - ADDTL 15 MIN: Performed by: ORTHOPAEDIC SURGERY

## 2022-03-30 PROCEDURE — 6370000000 HC RX 637 (ALT 250 FOR IP): Performed by: ORTHOPAEDIC SURGERY

## 2022-03-30 PROCEDURE — 7100000010 HC PHASE II RECOVERY - FIRST 15 MIN: Performed by: ORTHOPAEDIC SURGERY

## 2022-03-30 PROCEDURE — 2580000003 HC RX 258: Performed by: NURSE ANESTHETIST, CERTIFIED REGISTERED

## 2022-03-30 RX ORDER — AMOXICILLIN 250 MG
2 CAPSULE ORAL DAILY PRN
Qty: 30 TABLET | Refills: 2 | Status: SHIPPED | OUTPATIENT
Start: 2022-03-30

## 2022-03-30 RX ORDER — ONDANSETRON 2 MG/ML
4 INJECTION INTRAMUSCULAR; INTRAVENOUS
Status: DISCONTINUED | OUTPATIENT
Start: 2022-03-30 | End: 2022-03-30 | Stop reason: HOSPADM

## 2022-03-30 RX ORDER — MIDAZOLAM HYDROCHLORIDE 1 MG/ML
INJECTION INTRAMUSCULAR; INTRAVENOUS PRN
Status: DISCONTINUED | OUTPATIENT
Start: 2022-03-30 | End: 2022-03-30 | Stop reason: SDUPTHER

## 2022-03-30 RX ORDER — SODIUM CHLORIDE 0.9 % (FLUSH) 0.9 %
5-40 SYRINGE (ML) INJECTION EVERY 12 HOURS SCHEDULED
Status: DISCONTINUED | OUTPATIENT
Start: 2022-03-30 | End: 2022-03-30 | Stop reason: HOSPADM

## 2022-03-30 RX ORDER — FENTANYL CITRATE 50 UG/ML
INJECTION, SOLUTION INTRAMUSCULAR; INTRAVENOUS
Status: COMPLETED
Start: 2022-03-30 | End: 2022-03-30

## 2022-03-30 RX ORDER — TRAMADOL HYDROCHLORIDE 50 MG/1
50 TABLET ORAL EVERY 4 HOURS PRN
Qty: 28 TABLET | Refills: 0 | Status: SHIPPED | OUTPATIENT
Start: 2022-03-30 | End: 2022-04-06

## 2022-03-30 RX ORDER — OXYCODONE HYDROCHLORIDE 5 MG/1
10 TABLET ORAL PRN
Status: DISCONTINUED | OUTPATIENT
Start: 2022-03-30 | End: 2022-03-30 | Stop reason: HOSPADM

## 2022-03-30 RX ORDER — SODIUM CHLORIDE 9 MG/ML
INJECTION, SOLUTION INTRAVENOUS PRN
Status: DISCONTINUED | OUTPATIENT
Start: 2022-03-30 | End: 2022-03-30 | Stop reason: HOSPADM

## 2022-03-30 RX ORDER — MIDAZOLAM HYDROCHLORIDE 1 MG/ML
INJECTION INTRAMUSCULAR; INTRAVENOUS
Status: COMPLETED
Start: 2022-03-30 | End: 2022-03-30

## 2022-03-30 RX ORDER — FENTANYL CITRATE 50 UG/ML
INJECTION, SOLUTION INTRAMUSCULAR; INTRAVENOUS PRN
Status: DISCONTINUED | OUTPATIENT
Start: 2022-03-30 | End: 2022-03-30 | Stop reason: SDUPTHER

## 2022-03-30 RX ORDER — SODIUM CHLORIDE, SODIUM LACTATE, POTASSIUM CHLORIDE, CALCIUM CHLORIDE 600; 310; 30; 20 MG/100ML; MG/100ML; MG/100ML; MG/100ML
INJECTION, SOLUTION INTRAVENOUS CONTINUOUS
Status: DISCONTINUED | OUTPATIENT
Start: 2022-03-30 | End: 2022-03-30 | Stop reason: HOSPADM

## 2022-03-30 RX ORDER — ONDANSETRON 4 MG/1
4 TABLET, ORALLY DISINTEGRATING ORAL EVERY 8 HOURS PRN
Qty: 21 TABLET | Refills: 0 | Status: SHIPPED | OUTPATIENT
Start: 2022-03-30

## 2022-03-30 RX ORDER — ROPIVACAINE HYDROCHLORIDE 5 MG/ML
INJECTION, SOLUTION EPIDURAL; INFILTRATION; PERINEURAL
Status: COMPLETED
Start: 2022-03-30 | End: 2022-03-30

## 2022-03-30 RX ORDER — DEXAMETHASONE SODIUM PHOSPHATE 4 MG/ML
4 INJECTION, SOLUTION INTRA-ARTICULAR; INTRALESIONAL; INTRAMUSCULAR; INTRAVENOUS; SOFT TISSUE ONCE
Status: COMPLETED | OUTPATIENT
Start: 2022-03-30 | End: 2022-03-30

## 2022-03-30 RX ORDER — CELECOXIB 200 MG/1
400 CAPSULE ORAL ONCE
Status: COMPLETED | OUTPATIENT
Start: 2022-03-30 | End: 2022-03-30

## 2022-03-30 RX ORDER — MAGNESIUM HYDROXIDE 1200 MG/15ML
LIQUID ORAL CONTINUOUS PRN
Status: COMPLETED | OUTPATIENT
Start: 2022-03-30 | End: 2022-03-30

## 2022-03-30 RX ORDER — LABETALOL HYDROCHLORIDE 5 MG/ML
10 INJECTION, SOLUTION INTRAVENOUS
Status: DISCONTINUED | OUTPATIENT
Start: 2022-03-30 | End: 2022-03-30 | Stop reason: HOSPADM

## 2022-03-30 RX ORDER — GABAPENTIN 300 MG/1
300 CAPSULE ORAL ONCE
Status: COMPLETED | OUTPATIENT
Start: 2022-03-30 | End: 2022-03-30

## 2022-03-30 RX ORDER — SODIUM CHLORIDE, SODIUM LACTATE, POTASSIUM CHLORIDE, CALCIUM CHLORIDE 600; 310; 30; 20 MG/100ML; MG/100ML; MG/100ML; MG/100ML
INJECTION, SOLUTION INTRAVENOUS CONTINUOUS PRN
Status: DISCONTINUED | OUTPATIENT
Start: 2022-03-30 | End: 2022-03-30 | Stop reason: SDUPTHER

## 2022-03-30 RX ORDER — FENTANYL CITRATE 50 UG/ML
25 INJECTION, SOLUTION INTRAMUSCULAR; INTRAVENOUS EVERY 5 MIN PRN
Status: DISCONTINUED | OUTPATIENT
Start: 2022-03-30 | End: 2022-03-30 | Stop reason: HOSPADM

## 2022-03-30 RX ORDER — PROPOFOL 10 MG/ML
INJECTION, EMULSION INTRAVENOUS CONTINUOUS PRN
Status: DISCONTINUED | OUTPATIENT
Start: 2022-03-30 | End: 2022-03-30 | Stop reason: SDUPTHER

## 2022-03-30 RX ORDER — OXYCODONE HYDROCHLORIDE 5 MG/1
5 TABLET ORAL EVERY 6 HOURS PRN
Qty: 20 TABLET | Refills: 0 | Status: SHIPPED | OUTPATIENT
Start: 2022-03-30 | End: 2022-03-30 | Stop reason: HOSPADM

## 2022-03-30 RX ORDER — ROPIVACAINE HYDROCHLORIDE 5 MG/ML
INJECTION, SOLUTION EPIDURAL; INFILTRATION; PERINEURAL PRN
Status: DISCONTINUED | OUTPATIENT
Start: 2022-03-30 | End: 2022-03-30 | Stop reason: SDUPTHER

## 2022-03-30 RX ORDER — HYDRALAZINE HYDROCHLORIDE 20 MG/ML
10 INJECTION INTRAMUSCULAR; INTRAVENOUS
Status: DISCONTINUED | OUTPATIENT
Start: 2022-03-30 | End: 2022-03-30 | Stop reason: HOSPADM

## 2022-03-30 RX ORDER — ACETAMINOPHEN 500 MG
1000 TABLET ORAL ONCE
Status: COMPLETED | OUTPATIENT
Start: 2022-03-30 | End: 2022-03-30

## 2022-03-30 RX ORDER — SODIUM CHLORIDE 0.9 % (FLUSH) 0.9 %
5-40 SYRINGE (ML) INJECTION PRN
Status: DISCONTINUED | OUTPATIENT
Start: 2022-03-30 | End: 2022-03-30 | Stop reason: HOSPADM

## 2022-03-30 RX ORDER — OXYCODONE HYDROCHLORIDE 5 MG/1
5 TABLET ORAL PRN
Status: DISCONTINUED | OUTPATIENT
Start: 2022-03-30 | End: 2022-03-30 | Stop reason: HOSPADM

## 2022-03-30 RX ORDER — GLYCOPYRROLATE 0.2 MG/ML
INJECTION INTRAMUSCULAR; INTRAVENOUS PRN
Status: DISCONTINUED | OUTPATIENT
Start: 2022-03-30 | End: 2022-03-30 | Stop reason: SDUPTHER

## 2022-03-30 RX ORDER — ACETAMINOPHEN 500 MG
1000 TABLET ORAL EVERY 4 HOURS PRN
COMMUNITY

## 2022-03-30 RX ORDER — ONDANSETRON 2 MG/ML
INJECTION INTRAMUSCULAR; INTRAVENOUS PRN
Status: DISCONTINUED | OUTPATIENT
Start: 2022-03-30 | End: 2022-03-30 | Stop reason: SDUPTHER

## 2022-03-30 RX ORDER — MEPERIDINE HYDROCHLORIDE 25 MG/ML
12.5 INJECTION INTRAMUSCULAR; INTRAVENOUS; SUBCUTANEOUS EVERY 5 MIN PRN
Status: DISCONTINUED | OUTPATIENT
Start: 2022-03-30 | End: 2022-03-30 | Stop reason: HOSPADM

## 2022-03-30 RX ADMIN — ACETAMINOPHEN 1000 MG: 500 TABLET ORAL at 06:41

## 2022-03-30 RX ADMIN — DEXAMETHASONE SODIUM PHOSPHATE 4 MG: 4 INJECTION, SOLUTION INTRAMUSCULAR; INTRAVENOUS at 07:03

## 2022-03-30 RX ADMIN — SODIUM CHLORIDE, SODIUM LACTATE, POTASSIUM CHLORIDE, AND CALCIUM CHLORIDE: .6; .31; .03; .02 INJECTION, SOLUTION INTRAVENOUS at 07:37

## 2022-03-30 RX ADMIN — ROPIVACAINE HYDROCHLORIDE 30 ML: 5 INJECTION, SOLUTION EPIDURAL; INFILTRATION; PERINEURAL at 07:15

## 2022-03-30 RX ADMIN — GLYCOPYRROLATE 0.2 MG: 0.2 INJECTION INTRAMUSCULAR; INTRAVENOUS at 07:43

## 2022-03-30 RX ADMIN — FENTANYL CITRATE 100 MCG: 50 INJECTION, SOLUTION INTRAMUSCULAR; INTRAVENOUS at 07:15

## 2022-03-30 RX ADMIN — FENTANYL CITRATE 25 MCG: 50 INJECTION, SOLUTION INTRAMUSCULAR; INTRAVENOUS at 07:36

## 2022-03-30 RX ADMIN — PROPOFOL 50 MCG/KG/MIN: 10 INJECTION, EMULSION INTRAVENOUS at 07:40

## 2022-03-30 RX ADMIN — ONDANSETRON 4 MG: 2 INJECTION INTRAMUSCULAR; INTRAVENOUS at 08:16

## 2022-03-30 RX ADMIN — PHENYLEPHRINE HYDROCHLORIDE 100 MCG: 10 INJECTION, SOLUTION INTRAMUSCULAR; INTRAVENOUS; SUBCUTANEOUS at 07:54

## 2022-03-30 RX ADMIN — PHENYLEPHRINE HYDROCHLORIDE 100 MCG: 10 INJECTION, SOLUTION INTRAMUSCULAR; INTRAVENOUS; SUBCUTANEOUS at 07:59

## 2022-03-30 RX ADMIN — CELECOXIB 400 MG: 200 CAPSULE ORAL at 06:42

## 2022-03-30 RX ADMIN — GLYCOPYRROLATE 0.2 MG: 0.2 INJECTION INTRAMUSCULAR; INTRAVENOUS at 07:54

## 2022-03-30 RX ADMIN — MIDAZOLAM HYDROCHLORIDE 2 MG: 2 INJECTION, SOLUTION INTRAMUSCULAR; INTRAVENOUS at 07:15

## 2022-03-30 RX ADMIN — GABAPENTIN 300 MG: 300 CAPSULE ORAL at 06:41

## 2022-03-30 RX ADMIN — SODIUM CHLORIDE, POTASSIUM CHLORIDE, SODIUM LACTATE AND CALCIUM CHLORIDE: 600; 310; 30; 20 INJECTION, SOLUTION INTRAVENOUS at 06:55

## 2022-03-30 ASSESSMENT — PULMONARY FUNCTION TESTS
PIF_VALUE: 1
PIF_VALUE: 0
PIF_VALUE: 1
PIF_VALUE: 0
PIF_VALUE: 1
PIF_VALUE: 0
PIF_VALUE: 0
PIF_VALUE: 1
PIF_VALUE: 0
PIF_VALUE: 3
PIF_VALUE: 1
PIF_VALUE: 0
PIF_VALUE: 0
PIF_VALUE: 1
PIF_VALUE: 0
PIF_VALUE: 1
PIF_VALUE: 0
PIF_VALUE: 1

## 2022-03-30 ASSESSMENT — LIFESTYLE VARIABLES: SMOKING_STATUS: 1

## 2022-03-30 ASSESSMENT — PAIN SCALES - GENERAL
PAINLEVEL_OUTOF10: 0

## 2022-03-30 NOTE — PROGRESS NOTES
Patient arrived from OR to PACU # 13 s/p     RIGHT WRIST HARDWARE REMOVAL/ CARPAL TUNNEL RELEASE (Right )   Per . Attached to PACU monitoring device, report received from CRNA who stated no problems intraoperatively, patient recd pre-op block. Right arm numb no pain, elevated on pillow.

## 2022-03-30 NOTE — PROGRESS NOTES
Pt to [facility] for right hand/wrist surgery. Pt has been vaccinated for Covid. Pt is alert; oriented X 4; speech clear; breathing easily on RA; walks with steady gait without assistive device per pt report. Has hx of right breast CA; Medicated pt with preop meds: gabapentin, tylenol, celebrex, and decadron 4 mg IV. #18 IV placed in left wrist after warming pt. Dr. Porsche Gerardo administered right supraclavicular block, and versed 2 mg and fentanyl 100 mcg, and pt tolerated all well. Pt now to surgery.  has pt's glasses.   Ancef 2 g IVPB to OR with pt

## 2022-03-30 NOTE — PROGRESS NOTES
Ambulatory Surgery/Procedure Discharge Note    Vitals:    03/30/22 1021   BP: 96/68   Pulse: 68   Resp: (!) 69   Temp: 97.2 °F (36.2 °C)   SpO2: 94%     Patient arrived to Phase II recovery Alert and Oriented x4. Vitals stable. Patient denies pain. Nerve block. No nausea or vomiting.  at bedside. Discharge instructions reviewed with patient and . Both verbalize understanding. In: 700 [I.V.:700]  Out: -     Restroom use offered before discharge. Yes    Pain assessment:  none  Pain Level: 0        Patient discharged to home/self care. Patient discharged via wheel chair home with spouse.       3/30/2022 10:49 AM

## 2022-03-30 NOTE — ANESTHESIA PROCEDURE NOTES
Peripheral Block    Patient location during procedure: pre-op  Staffing  Performed: anesthesiologist   Preanesthetic Checklist  Completed: patient identified, IV checked, site marked, risks and benefits discussed, surgical consent, monitors and equipment checked, pre-op evaluation, timeout performed, anesthesia consent given, oxygen available and patient being monitored  Peripheral Block  Patient position: supine  Prep: ChloraPrep  Patient monitoring: cardiac monitor, continuous pulse ox, continuous capnometry, frequent blood pressure checks and IV access  Block type: Brachial plexus  Laterality: right  Injection technique: single-shot  Guidance: ultrasound guided  Supraclavicular  Needle  Needle gauge: 22 G  Needle localization: anatomical landmarks and ultrasound guidance  Assessment  Injection assessment: negative aspiration for heme, no paresthesia on injection and local visualized surrounding nerve on ultrasound  Paresthesia pain: none  Slow fractionated injection: yes  Hemodynamics: stable  Additional Notes  Sterile prep. Timeout with SDS RN. 2 mg versed + 100 micrograms fentanyl administered for pt comfort. 30 mL 0.5% Ropivacaine injected in 5 mL increments following negative aspiration. Tip of needle in view at all times. No pain or paresthesias on injection. Pt tolerated the procedure well.    Reason for block: procedure for pain, post-op pain management and at surgeon's request

## 2022-03-30 NOTE — PROGRESS NOTES
Patient removed from monitors, transportation called when patient asked what rx was given for pain. Informed oxycodone, stated she can't take that causes hallucinations. [facility] called made aware and will give paper script after current surgery completed. Aware pt transferring to [facility], and [facility] Luis Azul called and updated.  called again and updated.

## 2022-03-30 NOTE — H&P
Reinier Horta    [id number]    [facility] Same Day Surgery Update H & P  Department of General Surgery   Surgical Service   Pre-operative History and Physical  Last H & P within the last 30 days. DIAGNOSIS:   Painful orthopaedic hardware [facility]) [T84.84XA]  Carpal tunnel syndrome, bilateral [G56.03]  Closed fracture of distal end of right radius, unspecified fracture morphology, initial encounter [S52.501A]    Procedure(s):  RIGHT WRIST HARDWARE REMOVAL/ CARPAL TUNNEL RELEASE  . History obtained from: Patient interview and EHR      HISTORY OF PRESENT ILLNESS:   The patient is a 78 y.o. female with c/o right hand/wrist pain and numbness in the setting of retained orthopedic hardware presents today for removal.      Illness Screening: Patient denies fever, chills, worsening cough, or close contact with sick individuals.         Past Medical History:        Diagnosis Date    CAD (coronary artery disease)     Cancer ([facility] [address]     breast RIGHT    Dental bridge present     Dental crowns present     Hyperlipidemia     Hypertension     Lung nodule     Osteoporosis     PONV (postoperative nausea and vomiting)     Prolonged emergence from general anesthesia     Wears glasses      Past Surgical History:        Procedure Laterality Date    AXILLARY SURGERY Right     BREAST SURGERY Right     CARPAL TUNNEL RELEASE Left 9/8/2021    LEFT CARPAL TUNNEL RELEASE/ LEFT WRIST / HAND HARDWARE REMOVAL performed by Sara Santacruz MD at [address] [facility]      FOREARM SURGERY Left 9/18/2020    LEFT OPEN REDUCTION INTERNAL FIXATION DISTAL RADIUS; APPLICATION OF SHORT ARM CAST RIGHT ARM performed by Sara Santacruz MD at [address] Right 10/8/2020    DISTAL RADIUS OPEN REDUCTION INTERNAL FIXATION performed by Sara Santacruz MD at [address] [facility]    LEG MASS EXCISION Left     PARTIAL NEPHRECTOMY Right 2018    TONSILLECTOMY AND ADENOIDECTOMY      WRIST SURGERY Left 9/8/2021    . performed by Helen Harley MD at [facility] OR       Medications Prior to Admission:      Prior to Admission medications    Medication Sig Start Date End Date Taking? Authorizing Provider   acetaminophen (TYLENOL) 500 MG tablet Take 1,000 mg by mouth every 4 hours as needed     Historical Provider, MD   Cholecalciferol (VITAMIN D3) 1.25 MG (10309 UT) CAPS Take 1 capsule by mouth daily     Historical Provider, MD   atorvastatin (LIPITOR) 10 MG tablet Take 10 mg by mouth daily    Historical Provider, MD   amLODIPine (NORVASC) 5 MG tablet Take 5 mg by mouth daily    Historical Provider, MD   aspirin 81 MG EC tablet Take 81 mg by mouth daily  Patient not taking: Reported on 3/21/2022    Historical Provider, MD   ondansetron (ZOFRAN-ODT) 4 MG disintegrating tablet Take 1 tablet by mouth every 8 hours as needed for Nausea or Vomiting 9/18/20   Helen Harley MD         Allergies:  Clarithromycin, Anastrozole, Propofol, Codeine, Hydrocodone-acetaminophen, and Oxycodone-acetaminophen    PHYSICAL EXAM:      /68   Pulse 58   Temp 97.8 °F (36.6 °C) (Oral)   Resp 16   Ht 4' 11\" (1.499 m)   Wt 136 lb 9.6 oz (62 kg)   SpO2 96%   BMI 27.59 kg/m²      Airway:  Airway patent with no audible stridor    Heart:  Regular rate and rhythm, No murmur noted    Lungs:  No increased work of breathing, good air exchange, clear to auscultation bilaterally, no crackles or wheezing    Abdomen:  Soft, non-distended, non-tender, no rebound tenderness or guarding, and no masses palpated    ASSESSMENT AND PLAN     Patient is a 78 y.o. female with above specified procedure planned. 1.  The patients history and physical was obtained and signed off by the pre-admission testing department. Patient seen and focused exam done today- no new changes since last physical exam on 3/24/22    2.   Access to ancillary services are available per request of the provider.     Anna March, APRN - CNP     3/30/2022

## 2022-03-30 NOTE — PROGRESS NOTES
PACU Transfer to [facility]    Procedure(s):  RIGHT WRIST HARDWARE REMOVAL/ CARPAL TUNNEL RELEASE  . Pt's Current Allergies: Clarithromycin, Anastrozole, Propofol, Codeine, Hydrocodone-acetaminophen, and Oxycodone-acetaminophen    Pt meets criteria to transfer to next phase of care per Carly Jennings and ASPAN standards    No results for input(s): POCGLU in the last 72 hours. Vitals:    03/30/22 1000   BP: 115/70   Pulse: 66   Resp: 16   Temp: 97 °F (36.1 °C)   SpO2: 93%      BP within 20% of pt's admitting BP as per Huyen Score      Intake/Output Summary (Last 24 hours) at 3/30/2022 1018  Last data filed at 3/30/2022 0820  Gross per 24 hour   Intake 700 ml   Output --   Net 700 ml       Pain assessment:  none  Pain Level: 0    Patient was assessed for unknown alterations to skin integrity. There were not unknown alterations observed. Patient transferred to care of [facility].     updated and directed to [facility]    3/30/2022 10:18 AM

## 2022-03-30 NOTE — ANESTHESIA PRE PROCEDURE
Department of Anesthesiology  Preprocedure Note       Name:  Luigi Lazo   Age:  78 y.o.  :  1955                                          MRN:  [id number]         Date:  3/30/2022      Surgeon: Jerry Tellez):  Jackeline Quach MD    Procedure: Procedure(s):  RIGHT WRIST HARDWARE REMOVAL/ CARPAL TUNNEL RELEASE  . Medications prior to admission:   Prior to Admission medications    Medication Sig Start Date End Date Taking? Authorizing Provider   oxyCODONE (ROXICODONE) 5 MG immediate release tablet Take 1 tablet by mouth every 6 hours as needed for Pain (half tab for mild pain, whole tab for severe) for up to 7 days. Intended supply: 7 days. Take lowest dose possible to manage pain 3/30/22 4/6/22  Jackeline Quach MD   ondansetron (ZOFRAN-ODT) 4 MG disintegrating tablet Take 1 tablet by mouth every 8 hours as needed for Nausea or Vomiting 3/30/22   Jackeline Quach MD   senna-docusate (PERICOLACE) 8.6-50 MG per tablet Take 2 tablets by mouth daily as needed for Constipation 3/30/22   Jackeline Quach MD   acetaminophen (TYLENOL) 500 MG tablet Take 1,000 mg by mouth every 4 hours as needed     Historical Provider, MD   Cholecalciferol (VITAMIN D3) 1.25 MG (92189 UT) CAPS Take 1 capsule by mouth daily     Historical Provider, MD   atorvastatin (LIPITOR) 10 MG tablet Take 10 mg by mouth daily    Historical Provider, MD   amLODIPine (NORVASC) 5 MG tablet Take 5 mg by mouth daily    Historical Provider, MD   aspirin 81 MG EC tablet Take 81 mg by mouth daily  Patient not taking: Reported on 3/21/2022    Historical Provider, MD   ondansetron (ZOFRAN-ODT) 4 MG disintegrating tablet Take 1 tablet by mouth every 8 hours as needed for Nausea or Vomiting 20   Jackeline Quach MD       Current medications:    No current facility-administered medications for this visit.      Current Outpatient Medications   Medication Sig Dispense Refill    oxyCODONE (ROXICODONE) 5 MG immediate release tablet Take 1 tablet by mouth every 6 hours as needed for Pain (half tab for mild pain, whole tab for severe) for up to 7 days. Intended supply: 7 days. Take lowest dose possible to manage pain 20 tablet 0    ondansetron (ZOFRAN-ODT) 4 MG disintegrating tablet Take 1 tablet by mouth every 8 hours as needed for Nausea or Vomiting 21 tablet 0    senna-docusate (PERICOLACE) 8.6-50 MG per tablet Take 2 tablets by mouth daily as needed for Constipation 30 tablet 2     Facility-Administered Medications Ordered in Other Visits   Medication Dose Route Frequency Provider Last Rate Last Admin    ceFAZolin (ANCEF) 2000 mg in dextrose 5 % 50 mL IVPB  2,000 mg IntraVENous Once Aury Olivo MD        lactated ringers infusion   IntraVENous Continuous Ascencion DO Gifty 100 mL/hr at 03/30/22 0655 New Bag at 03/30/22 0655    midazolam (VERSED) 2 MG/2ML injection             fentaNYL (SUBLIMAZE) 100 MCG/2ML injection             ropivacaine (NAROPIN) 0.5% injection                Allergies:     Allergies   Allergen Reactions    Clarithromycin Rash and Swelling     Blistering  Blisters on lips, tongue, mouth   Mouth and lips      Anastrozole Other (See Comments)     Allergic reaction rash  hypotension    Propofol Nausea Only     Pt does not agree with this statement on this allergy    Codeine Nausea Only and Nausea And Vomiting    Hydrocodone-Acetaminophen Nausea And Vomiting    Oxycodone-Acetaminophen Nausea And Vomiting       Problem List:    Patient Active Problem List   Diagnosis Code   (none) - all problems resolved or deleted       Past Medical History:        Diagnosis Date    CAD (coronary artery disease)     Cancer ([facility] [address]     breast RIGHT    Dental bridge present     Dental crowns present     Hyperlipidemia     Hypertension     Lung nodule     Osteoporosis     PONV (postoperative nausea and vomiting)     Prolonged emergence from general anesthesia     Wears glasses Past Surgical History:        Procedure Laterality Date    AXILLARY SURGERY Right     BREAST SURGERY Right     CARDIAC SURGERY      stent    CARPAL TUNNEL RELEASE Left 9/8/2021    LEFT CARPAL TUNNEL RELEASE/ LEFT WRIST / HAND HARDWARE REMOVAL performed by uJan Osorio [facility] at [facility] AND CURETTAGE OF UTERUS      FOREARM SURGERY Left 9/18/2020    LEFT OPEN REDUCTION INTERNAL FIXATION DISTAL RADIUS; APPLICATION OF SHORT ARM CAST RIGHT ARM performed by Juan Osorio MD at [address] Right 10/8/2020    DISTAL RADIUS OPEN REDUCTION INTERNAL FIXATION performed by Juan Osorio MD at [address]  2020    LEG MASS EXCISION Left     PARTIAL NEPHRECTOMY Right 2018    TONSILLECTOMY AND ADENOIDECTOMY      WRIST SURGERY Left 9/8/2021    . performed by Juan Osorio MD at [address]  History:    Social History     Tobacco Use    Smoking status: Current Every Day Smoker     Packs/day: 1.00     Years: 30.00     Pack years: 30.00     Types: Cigarettes    Smokeless tobacco: Never Used   Substance Use Topics    Alcohol use: No     Comment: rarely- 1 or 2 a year                                Ready to quit: Not Answered  Counseling given: Not Answered      Vital Signs (Current): There were no vitals filed for this visit.                                            BP Readings from Last 3 Encounters:   03/30/22 107/63   09/08/21 126/81   09/08/21 (!) 93/50       NPO Status:                                                                                 BMI:   Wt Readings from Last 3 Encounters:   03/30/22 136 lb 9.6 oz (62 kg)   09/08/21 141 lb (64 kg)   10/08/20 135 lb (61.2 kg)     There is no height or weight on file to calculate BMI.    CBC: No results found for: WBC, RBC, HGB, HCT, MCV, RDW, PLT    CMP:   Lab Results   Component Value Date    CO2 24 10/20/2014    CREATININE 1.1 10/20/2014    GFRAA >60 10/20/2014    LABGLOM 54 10/20/2014       POC Tests: No results for input(s): POCGLU, POCNA, POCK, POCCL, POCBUN, POCHEMO, POCHCT in the last 72 hours. Coags: No results found for: PROTIME, INR, APTT    HCG (If Applicable): No results found for: PREGTESTUR, PREGSERUM, HCG, HCGQUANT     ABGs: No results found for: PHART, PO2ART, GFA1QTI, RAE4WAI, BEART, Z2RGQFHJ     Type & Screen (If Applicable):  No results found for: LABABO, LABRH    Drug/Infectious Status (If Applicable):  No results found for: HIV, HEPCAB    COVID-19 Screening (If Applicable):   Lab Results   Component Value Date    COVID19 Not Detected 10/08/2020    COVID19 NOT DETECTED 10/06/2020         Anesthesia Evaluation  Patient summary reviewed and Nursing notes reviewed   history of anesthetic complications: PONV. Airway: Mallampati: II  TM distance: >3 FB   Neck ROM: full  Mouth opening: > = 3 FB Dental:      Comment: Crowns   Upper     Pulmonary: breath sounds clear to auscultation  (+) current smoker (1 ppd x 30 yrs)          Patient did not smoke on day of surgery. Cardiovascular:  Exercise tolerance: good (>4 METS),   (+) past MI (2014  mi followed by stent ):, CAD:,     (-)  angina    NYHA Classification: II  ECG reviewed  Rhythm: regular  Rate: normal           Beta Blocker:  Not on Beta Blocker      ROS comment: Denies CP/SOB     Neuro/Psych:               GI/Hepatic/Renal:        (-) GERD       Endo/Other:    (+) malignancy/cancer (2015 breast ca radiation only ). Abdominal:             Vascular: Other Findings:               Anesthesia Plan      general, regional and TIVA     ASA 3     (Supraclavicular nerve block   Pt does NOT have a propofol allergy)  Induction: intravenous. MIPS: Postoperative opioids intended and Prophylactic antiemetics administered. Anesthetic plan and risks discussed with patient. Plan discussed with CONSTANCE Atkinson Ely King, DO   3/30/2022

## 2022-03-30 NOTE — ANESTHESIA POSTPROCEDURE EVALUATION
Department of Anesthesiology  Postprocedure Note    Patient: Wilfred Carvalho  MRN: [id number]  YOB: 1955  Date of evaluation: 3/30/2022  Time:  10:53 AM     Procedure Summary     Date: 03/30/22 Room / Location: [address]    Anesthesia Start: 3417 Anesthesia Stop: 0232    Procedures:       RIGHT WRIST HARDWARE REMOVAL/ CARPAL TUNNEL RELEASE (Right )      . (Right ) Diagnosis:       Painful orthopaedic hardware [facility])      Carpal tunnel syndrome, bilateral      Closed fracture of distal end of right radius, unspecified fracture morphology, initial encounter      (Closed fracture of distal end of right radius, unspecified fracture morphology, initial encounter [S52.501A] Painful orthopaedic hardware [facility]) [T84.84XA]Carpal tunnel syndrome, bilateral [G56.03])    Surgeons: Leah Simmons MD Responsible Provider: Virginia Reardon DO    Anesthesia Type: general, regional, TIVA ASA Status: 3          Anesthesia Type: general, regional, TIVA    Huyen Phase I: Huyen Score: 10    Huyen Phase II: Huyen Score: 10    Last vitals: Reviewed and per EMR flowsheets.        Anesthesia Post Evaluation    Patient location during evaluation: PACU  Patient participation: complete - patient participated  Level of consciousness: awake and alert  Pain score: 0  Airway patency: patent  Nausea & Vomiting: no nausea and no vomiting  Cardiovascular status: blood pressure returned to baseline  Respiratory status: acceptable  Hydration status: euvolemic  Multimodal analgesia pain management approach

## 2022-03-30 NOTE — OP NOTE
Brigida Sinha MD  [place] Office: [address], [address] [facility] (5451) [place]        Removal of hardware and carpal tunnel release     Zakia Wynne  : 1955  PCP: [address] [facility]    Surgery Date: 3/30/2022    PREOPERATIVE DIAGNOSIS: Right  Symptomatic hardware and carpal tunnel syndrome. POSTOPERATIVE DIAGNOSIS: Same    PROCEDURES PERFORMED:  Right wrist removal of deep orthopedic implant - volar plate  Open Carpal tunnel release    ANESTHESIA: General anesthesia    ANTIBIOTIC: CEFAZOLIN, DOSING PER NURSING CHART    SURGEON: Brenda Tyson MD    Assistant: Surgical Assistant: Candi Qua; Cherylyn Essex. Needed for reduction, limb manipulation, tissue retraction, and wound closure as indicated. IMPLANTS:  None    COMPLICATIONS: None    POSTOPERATIVE CONDITION:  Stable to PACU.     ESTIMATED BLOOD LOSS: 10cc    HISTORY OF PRESENT ILLNESS: The patient is a 78 y.o. female who sustained bilateral distal radius fractures in . Underwent open reduction and internal fixation of the same. She followed up and developed carpal tunnel symptoms on the right as well as some pain with  in her to flexors. Discussion was had operative versus nonoperative management. The patient was consented for the above-noted procedure and agreed to proceed. All questions were answered. The patient was seen in the pre-operative holding area, the operative extremity was marked. Appropriate pre-operative antibiotics were given. RISKS OF SURGERY:  I had an extensive conversation with the patient and/or family regarding the risks, benefits, potential complication and reasonable expectations of the planned procedure. Informed verbal consent was given under no distress. We had ample opportunities for questions regarding the usual outcomes.   Risks specifically discussed, but not limited to the following, include possible: bleeding, blood clots, anesthesia complications, nerve and blood vessel injury, infection, re-injury, need for additional surgery, and post-operative stiffness. In addition, in rare cases the patient may have loss of a limb or even death. No guarantee of any particular results were given. The patient understands that in some cases surgery can make them worse. The patient and family desired that we proceed at the soonest possible time with the operation and expressed clear understanding. DETAILS OF PROCEDURE:      Patient was met in the preoperative holding area. The operative extremity was marked. We reviewed the history, physical and reviewed risks, benefits and alternatives to the surgical procedure including the risk of bleeding, infection, damage surrounding structures and need for possible future surgeries. The patient was in agreement with proceeding with surgery.     Patient was brought into the operating theater, placed supine on the OR table where anesthesia was induced. The patient was prepped and draped in the supine position with all bony prominences well-padded. Was prepped in the usual sterile fashion. Preoperative timeout was performed confirming the correct patient, procedure, laterality and other pertinent details and all present were in agreement     Tourniquet was inflated to 250 mmHg for total time of approximately 12 minutes     Prior volar approach to the distal radius was utilized. Prior incision was utilized carried full-thickness flaps down to the FCR tendon sheath. FCR was retracted ulnarly with care to protect the radial artery on the radial aspect. FPL was retracted and pronator was elevated. Plate was exposed. Proximal and distal screws were removed with no complication. Plate was removed. The fracture was examined and found to be solid and fully united at that time. Screw holes were curretted to stimulate filling in. Incision was irrigated.      Attention was then turned to the carpal tunnel release.   A standard incision was made in the palm of the hand in line with the 3rd web space. Dissection carried down through skin, subcutaneous tissue, and palmar fascia.  The transverse carpal ligament was identified and incised proximally.  A groove director was inserted to protect the contents of the carpal tunnel at all times.  After distal release was performed, attention was directed proximally.  Under direct visualization the proximal aspect was also released.  A fingertip could be inserted to ensure adequate proximal and distal decompression.  The contents of the carpal tunnel were examined and found to be consistent with changes of median nerve compression.  Hemostasis achieved with electrocautery.       Tourniquet was let down. Hemostasis was obtained. The wounds were irrigated with sterile saline.  Skin was closed with 4-0 nylon suture. Wounds were dressed with dry sterile dressing. And a well-padded volar splint was applied patient was awoken from anesthesia, transferred to hospital bed and subsequently the PACU in stable condition. All counts were correct x2 at the end of the case. I was present scrubbed for all portions the procedure except closure. POST OP PLAN  The patient will follow up in a week for wound check, xrays and staple/suture removal.    The patients weight bearing status is: gentle WBAT, avoid high impact activities for 6 weeks        SWETHA Estrada MD  [facility]  Office: [phone]  Cell: [phone]    03/30/22  8:19 AM

## 2023-09-19 NOTE — PROGRESS NOTES
Pt's  called and updated. Simple: Patient demonstrates quick and easy understanding/Verbalized Understanding

## (undated) DEVICE — PODIATRY PK

## (undated) DEVICE — SUTURE MCRYL SZ 4-0 L27IN ABSRB UD L19MM PS-2 1/2 CIR PRIM Y426H

## (undated) DEVICE — SUTURE VCRL SZ 2-0 L27IN ABSRB UD L26MM SH 1/2 CIR J417H

## (undated) DEVICE — PADDING CAST W4INXL4YD HIGHLY ABSRB THAN COT EZ APPL

## (undated) DEVICE — SHEET,DRAPE,53X77,STERILE: Brand: MEDLINE

## (undated) DEVICE — SCREW BNE L20MM DIA2.4MM CORT S STL ST T8 STARDRV RECESS: Type: IMPLANTABLE DEVICE | Site: WRIST | Status: NON-FUNCTIONAL

## (undated) DEVICE — GLOVE ORTHO 7 1/2   MSG9475

## (undated) DEVICE — JEWISH HOSPITAL TURNOVER KIT: Brand: MEDLINE INDUSTRIES, INC.

## (undated) DEVICE — PADDING,UNDERCAST,COTTON, 3X4YD STERILE: Brand: MEDLINE

## (undated) DEVICE — BANDAGE COMPR W2INXL5YD TAN COT CARING SELF ADH COHESIVE

## (undated) DEVICE — SUTURE ETHLN SZ 4-0 L18IN NONABSORBABLE BLK L24MM FS-1 3/8 1629H

## (undated) DEVICE — SUTURE PDS II SZ 4-0 L18IN ABSRB UD L19MM PS-2 3/8 CIR PRIM Z496G

## (undated) DEVICE — BIT DRL L100MM DIA2MM QUIK CPL W/O STP REUSE

## (undated) DEVICE — SUTURE ETHLN SZ 4-0 L18IN NONABSORBABLE BLK L19MM PS-2 3/8 1667H

## (undated) DEVICE — DRAPE C ARM W54XL84IN MINI FOR OEC 6800

## (undated) DEVICE — COVER LT HNDL BLU PLAS

## (undated) DEVICE — BANDAGE COMPR W1INXL5YD BGE E ADH TENSOPLAST

## (undated) DEVICE — TRAY,IRRIGATION,BULBSYRINGE,60ML,CSR,PVP: Brand: MEDLINE

## (undated) DEVICE — SYRINGE MED 10ML LUERLOCK TIP W/O SFTY DISP

## (undated) DEVICE — PAD,ABDOMINAL,5"X9",ST,LF,25/BX: Brand: MEDLINE INDUSTRIES, INC.

## (undated) DEVICE — SUTURE NONABSORBABLE MONOFILAMENT 3-0 PS-1 18 IN BLK ETHILON 1663H

## (undated) DEVICE — GARMENT,MEDLINE,DVT,INT,CALF,MED, GEN2: Brand: MEDLINE

## (undated) DEVICE — UNDERGLOVE SURG SZ 8 BLU LTX FREE SYN POLYISOPRENE POLYMER

## (undated) DEVICE — DRAPE EQUIP FOOTSWITCH 24X20 IN PUL CORDAND CRD LCK NS

## (undated) DEVICE — BANDAGE COBAN 4 IN COMPR W4INXL5YD FOAM COHESIVE QUIK STK SELF ADH SFT

## (undated) DEVICE — SUTURE ETHLN SZ 3-0 L18IN NONABSORBABLE BLK L24MM FS-1 3/8 663G

## (undated) DEVICE — GOWN,SIRUS,POLYRNF,BRTHSLV,XLN/XL,20/CS: Brand: MEDLINE

## (undated) DEVICE — GAUZE,SPONGE,4"X4",16PLY,XRAY,STRL,LF: Brand: MEDLINE

## (undated) DEVICE — TUBING SUCT 10FR MAL ALUM SHFT FN CAP VENT UNIV CONN W/ OBT

## (undated) DEVICE — ZIMMER® STERILE DISPOSABLE TOURNIQUET CUFF WITH PLC, DUAL PORT, SINGLE BLADDER, 18 IN. (46 CM)

## (undated) DEVICE — DRAPE,HAND,STERILE: Brand: MEDLINE

## (undated) DEVICE — SUTURE VCRL SZ 0 L18IN ABSRB UD L36MM CT-1 1/2 CIR J840D

## (undated) DEVICE — 3M™ STERI-DRAPE™ U-DRAPE 1015: Brand: STERI-DRAPE™

## (undated) DEVICE — APPLICATOR MEDICATED 26 CC SOLUTION HI LT ORNG CHLORAPREP

## (undated) DEVICE — CABLE BPLR L12FT FLYING LD DISPOSABLE

## (undated) DEVICE — PLATE ES AD W 9FT CRD 2

## (undated) DEVICE — SUTURE VCRL SZ 4-0 L18IN ABSRB UD L19MM PS-2 3/8 CIR PRIM J496H

## (undated) DEVICE — 1010 S-DRAPE TOWEL DRAPE 10/BX: Brand: STERI-DRAPE™

## (undated) DEVICE — DRAPE SURGICAL HAND PROX AURORA

## (undated) DEVICE — BLADE OPHTH 180DEG CUT SURF BLU STR SHRP DBL BVL GRINDLESS

## (undated) DEVICE — ELECTROSURGICAL PENCIL ROCKER SWITCH NON COATED BLADE ELECTRODE 10 FT (3 M) CORD HOLSTER: Brand: MEGADYNE

## (undated) DEVICE — SUTURE MCRYL SZ 4-0 L18IN ABSRB UD L19MM PS-2 3/8 CIR PRIM Y496G

## (undated) DEVICE — K WIRE FIX L150MM DIA1.6MM S STL TRCR PNT

## (undated) DEVICE — E-Z CLEAN, NON-STICK, PTFE COATED, ELECTROSURGICAL BLADE ELECTRODE, MODIFIED EXTENDED INSULATION, 2.5 INCH (6.35 CM): Brand: MEGADYNE

## (undated) DEVICE — DRAPE 70X60IN SPLIT IMPERV ADHES STRIP

## (undated) DEVICE — CONTAINER,SPECIMEN,PNEU TUBE,3OZ,OR STRL: Brand: MEDLINE

## (undated) DEVICE — BANDAGE COMPR M W4INXL10YD WHT BGE VELC E MTRX HK AND LOOP

## (undated) DEVICE — SOLUTION IV 1000ML 0.9% SOD CHL

## (undated) DEVICE — SCREW BNE L14MM DIA2.7MM CORT S STL ST T8 STARDRV RECESS
Type: IMPLANTABLE DEVICE | Site: WRIST | Status: NON-FUNCTIONAL
Removed: 2020-09-18

## (undated) DEVICE — SINGLE USE DEVICE INTENDED TO COVER EXPOSED ENDS OF ORTHOPEDIC PIN AND K-WIRES TO HELP PROTECT THE EXPOSED WIRE FROM SNAGGING ON CLOTHING.: Brand: OXBORO™ PIN COVER

## (undated) DEVICE — BIT DRL L110MM DIA1.8MM QUIK CPL CALIB W/O STP REUSE

## (undated) DEVICE — SUTURE VCRL SZ 2-0 L18IN ABSRB UD CT-1 L36MM 1/2 CIR J839D

## (undated) DEVICE — HAND: Brand: MEDLINE INDUSTRIES, INC.

## (undated) DEVICE — TOWEL,STOP FLAG GOLD N-W: Brand: MEDLINE

## (undated) DEVICE — C-ARM: Brand: UNBRANDED

## (undated) DEVICE — PADDING CAST N ADH 4 YDX2 IN HIGHLY ABSORBENT EZ APPL SOFROL

## (undated) DEVICE — SPONGE,LAP,18"X18",DLX,XR,ST,5/PK,40/PK: Brand: MEDLINE

## (undated) DEVICE — COVER,TABLE,HEAVY DUTY,77"X90",STRL: Brand: MEDLINE

## (undated) DEVICE — K WIRE FIX L150MM DIA1.25MM S STL TRCR PNT

## (undated) DEVICE — SUTURE ETHLN SZ 3-0 L18IN NONABSORBABLE BLK PS-2 L19MM 3/8 1669H